# Patient Record
Sex: FEMALE | Race: WHITE | NOT HISPANIC OR LATINO | Employment: FULL TIME | URBAN - METROPOLITAN AREA
[De-identification: names, ages, dates, MRNs, and addresses within clinical notes are randomized per-mention and may not be internally consistent; named-entity substitution may affect disease eponyms.]

---

## 2017-06-20 ENCOUNTER — GENERIC CONVERSION - ENCOUNTER (OUTPATIENT)
Dept: OTHER | Facility: OTHER | Age: 42
End: 2017-06-20

## 2017-06-22 ENCOUNTER — ALLSCRIPTS OFFICE VISIT (OUTPATIENT)
Dept: OTHER | Facility: OTHER | Age: 42
End: 2017-06-22

## 2017-07-03 ENCOUNTER — ALLSCRIPTS OFFICE VISIT (OUTPATIENT)
Dept: RADIOLOGY | Facility: CLINIC | Age: 42
End: 2017-07-03
Payer: COMMERCIAL

## 2017-07-17 ENCOUNTER — GENERIC CONVERSION - ENCOUNTER (OUTPATIENT)
Dept: OTHER | Facility: OTHER | Age: 42
End: 2017-07-17

## 2017-07-18 DIAGNOSIS — M54.16 RADICULOPATHY OF LUMBAR REGION: ICD-10-CM

## 2017-07-19 ENCOUNTER — APPOINTMENT (OUTPATIENT)
Dept: RADIOLOGY | Facility: CLINIC | Age: 42
End: 2017-07-19
Payer: COMMERCIAL

## 2017-07-19 ENCOUNTER — GENERIC CONVERSION - ENCOUNTER (OUTPATIENT)
Dept: OTHER | Facility: OTHER | Age: 42
End: 2017-07-19

## 2017-07-19 ENCOUNTER — TRANSCRIBE ORDERS (OUTPATIENT)
Dept: RADIOLOGY | Facility: CLINIC | Age: 42
End: 2017-07-19

## 2017-07-19 DIAGNOSIS — M54.16 RADICULOPATHY OF LUMBAR REGION: ICD-10-CM

## 2017-07-19 PROCEDURE — 72110 X-RAY EXAM L-2 SPINE 4/>VWS: CPT

## 2018-01-10 NOTE — MISCELLANEOUS
Message   Recorded as Task   Date: 04/20/2016 09:08 AM, Created By: Madi Simpson   Task Name: Follow Up   Assigned To: [facility] ,Team   Regarding Patient: Amisha Montanez, Status: In Progress   Comment:    DelroyusLorena - 20 Apr 2016 9:08 AM     TASK CREATED  S/p Right SIJ inj on 4/14/16 w/Dr Baljeet Ohara in [place]  RT L3 4 5 S1 MBB 0 25% on 4/28/16   IvonneSonia - 22 Apr 2016 11:13 AM     TASK EDITED  1st attempt - left message on patient's voice mail to call our office back regarding followup after injection  Sonia Coronado - 28 Apr 2016 9:06 AM     TASK EDITED  2nd attempt - left message on patient's voice mail to call our office back regarding followup after injection  Sonia Coronado - 29 Apr 2016 10:33 AM     TASK REASSIGNED: Previously Assigned To [facility] [place] [facility]  Please send Cannot reach you letter      Thank you   Letter Sent  Active Problems    1  Bilateral leg weakness (729 89) (M62 81)   2  Chronic low back pain (724 2,338 29) (M54 5,G89 29)   3  Chronic lumbar radiculopathy (724 4) (M54 16)   4  Disorder of sacrum (724 6) (M53 3)   5  Facet syndrome, lumbar (724 8) (M48 8X6)   6  Herniated nucleus pulposus, L5-S1 (722 10) (M51 27)   7  Lumbar spondylosis (721 3) (M47 816)   8  Pain syndrome, chronic (338 4) (G89 4)   9  Right hip pain (719 45) (M25 551)    Current Meds   1  [place] Thyroid TABS; TAKE 1 TABLET DAILY  TDD:60;   Therapy: (Recorded:18Mar2016) to Recorded   2  Cymbalta 30 MG Oral Capsule Delayed Release Particles (DULoxetine HCl); TAKE 1   CAPSULE DAILY; Therapy: (Recorded:18Mar2016) to Recorded   3  Ibuprofen 800 MG Oral Tablet; twice daily; Therapy: (Recorded:18Mar2016) to Recorded   4  Percocet TABS (Oxycodone-Acetaminophen); 5-325 mg as needed; Therapy: (Recorded:18Mar2016) to Recorded    Allergies    1   Demerol TABS    Signatures   Electronically signed by : Clara De La Rosa, ; May 10 2016 11:21AM EST                       (Author)

## 2018-01-11 NOTE — RESULT NOTES
Message   Recorded as Task   Date: 02/05/2016 08:41 AM, Created By: Macy Griffith   Task Name: Follow Up   Assigned To: [facility]   Regarding Patient: Harmony Philip, Status: In Progress   Sonia Vuongcharley - 05 Feb 2016 8:41 AM     TASK CREATED  Pt  s/p LESI on 2/1/16 at 1030 am w/ Dr Keagan Woody scheduled on 2/29/16 at 9 am w/ DG    Please contact pt  on 2/8/16     Lorena Carpenter - 15 Feb 2016 2:58 PM     TASK EDITED  S/w pt for f/u after injection  -states she has 60% improvement   -if she sits or stands too long pain increases & returns   -confirmed f/u ov w/Mando scheduled for 2/29/16   -pt will call if needed before that appointment if pain gets worse   Christoph Urena - 16 Feb 2016 8:06 AM     TASK REPLIED TO: Previously Assigned To Christoph Urena  aware        Signatures   Electronically signed by : Latrell Andrade RN; Feb 16 2016 10:02AM EST                       (Author)

## 2018-01-12 NOTE — RESULT NOTES
Message   Recorded as Task   Date: 07/06/2017 11:10 AM, Created By: Fidelia Hayward   Task Name: Follow Up   Assigned To: [facility] sched,Team   Regarding Patient: Desiree Mead, Status: Active   CommentLeane \[facility]\"" - 06 Jul 2017 11:10 AM     TASK CREATED  S/P RT SIJ on 07/03/2017 w/[facility] [place]  No f/u scheduled       Please call on 07/10/2017   Fidelia Hayward - 10 Jul 2017 10:48 AM     TASK EDITED  1st attempt LM to cb f/u to injection  Fidelia Hayward - 12 Jul 2017 10:04 AM     TASK EDITED  2nd attempt LM to cb   [facility] - 13 Jul 2017 11:17 AM     TASK EDITED  3rd attempt to reach pt, lm for pt to cb   Fidelia Hayward - 14 Jul 2017 8:53 AM     TASK EDITED  Please send Nayana Jasper - 17 Jul 2017 10:26 AM     TASK COMPLETED   Radha Valverde - 18 Jul 2017 9:34 AM     TASK REACTIVATED  phone call from patient returning call, patient states she was on vacation  patient states after the injection her back went out and right now she is feeling pretty lousy  patient can be reached at [phone]  Dhruv Hurst - 18 Jul 2017 9:55 AM     TASK EDITED  S/w pt, confirmed R SI joint inj on 7/3  Pt states she did not have significant relief after that injection  States that she "felt something" when she stood up on 7/14, was hiking - not strenuous - and could feel the area tightening  Pt states the pain is midline, down both legs, below r knee, stops at the knee on her L leg  Chiro yesterday - some relief  Pt c/o a lot of muscle tightness  Advised pt, will d/w Dr Sabiha Martinez / [facility] Call and cb to advise  Pt verbalized understanding and appreciation  Christoph Urena - 18 Jul 2017 10:52 AM     TASK REPLIED TO: Previously Assigned To Christoph Urena  id like tyo get x-ray and mri   Dhruv Hurst - 18 Jul 2017 11:10 AM     TASK EDITED  s/w pt, advised of above  Pt will  orders at the  on 7/19 between 8/4  Advised pt, this office will call with an A# for MRI  Do not schedule mri until the A# is provided   Pt verbalized understanding and appreication  Orders are at the  for Cami Villagomez - 19 Jul 2017 10:46 AM     TASK EDITED  MRI [employer] will be addressed in the financial task

## 2018-01-12 NOTE — MISCELLANEOUS
Message   Recorded as Task   Date: 06/19/2017 08:19 AM, Created By: Kamran Panchal   Task Name: Miscellaneous   Assigned To: [facility] clerical,Team   Regarding Patient: Juliette Escoto, Status: In Progress   Comment:    Ellison,Cami - 19 Jun 2017 8:19 AM     TASK CREATED  Caller: Self; [phone] (Home); [phone] (Work)  pt would like to get another SIJ injec  Last sij was done 4/14/2016 in which pt states she had great relief from that inj      pt c/o LBP starting to flare up it symptoms wrap around her hips and down to knees  Pt states no change in medical history, denies bld thinners/antbx  Pt states over the weekend she tried muscle relaxer and ibuprofen  advise will discuss w/provider and rtc  Mando Mcnally - 19 Jun 2017 8:33 AM     TASK REPLIED TO: Previously Assigned To Mando Mcnally  She will need an OV for re-evaluation since it has been over a year that she has been seen  Thank you  I can see her tomorrow or Thursday if you want to double book her  Cami Ellison - 19 Jun 2017 3:12 PM     TASK REASSIGNED: Previously Assigned To Yudy Ruff - 20 Jun 2017 1:37 PM     TASK IN PROGRESS   Agustina Chapman - 20 Jun 2017 1:49 PM     TASK EDITED  Pt is scheduled for 6/22/17 @ 2:15  Active Problems    1  Bilateral leg weakness (729 89) (R29 898)   2  Chronic low back pain (724 2,338 29) (M54 5,G89 29)   3  Chronic lumbar radiculopathy (724 4) (M54 16)   4  Disorder of sacrum (724 6) (M53 3)   5  Facet syndrome, lumbar (724 8) (M12 88)   6  Herniated nucleus pulposus, L5-S1 (722 10) (M51 27)   7  Lumbar spondylosis (721 3) (M47 816)   8  Pain syndrome, chronic (338 4) (G89 4)   9  Right hip pain (719 45) (M25 551)    Current Meds   1  [place] Thyroid TABS; TAKE 1 TABLET DAILY  TDD:60;   Therapy: (Recorded:18Mar2016) to Recorded   2  Cymbalta 30 MG Oral Capsule Delayed Release Particles (DULoxetine HCl); TAKE 1   CAPSULE DAILY;    Therapy: (Recorded:18Mar2016) to Recorded 3  Ibuprofen 800 MG Oral Tablet; twice daily; Therapy: (Recorded:18Mar2016) to Recorded   4  Percocet TABS (Oxycodone-Acetaminophen); 5-325 mg as needed; Therapy: (Recorded:18Mar2016) to Recorded    Allergies    1   Demerol TABS    Signatures   Electronically signed by : Julia Ochoa, ; Jun 20 2017  1:50PM EST                       (Author)

## 2018-01-12 NOTE — RESULT NOTES
Message   Recorded as Task   Date: 01/22/2016 08:18 AM, Created By: Rod Fisher   Task Name: Follow Up   Assigned To: [facility]   Regarding Patient: Dante Cannon, Status: Active   Comment:    Lorena Carpenter - 22 Jan 2016 8:18 AM     TASK CREATED  S/p B/L S1 TFESI on 1/15/16 w/Dr Britt Fajardo in [facility]  F/u schedule 1/29/16   Lorena Carpenter - 28 Jan 2016 9:38 AM     TASK EDITED  F/u scheduled for 1/29/16-will discuss at that time

## 2018-01-13 VITALS
HEIGHT: 66 IN | SYSTOLIC BLOOD PRESSURE: 120 MMHG | BODY MASS INDEX: 27.64 KG/M2 | HEART RATE: 64 BPM | TEMPERATURE: 97.9 F | WEIGHT: 172 LBS | DIASTOLIC BLOOD PRESSURE: 74 MMHG

## 2018-01-13 NOTE — MISCELLANEOUS
Message   Recorded as Task   Date: 07/06/2017 11:10 AM, Created By: Zoya Claire   Task Name: Follow Up   Assigned To: [address] ,Team   Regarding Patient: Juliette Escoto, Status: Active   CommentClara Gonzalez - 06 Jul 2017 11:10 AM     TASK CREATED  S/P RT SIJ on 07/03/2017 w/[facility] [place]  No f/u scheduled       Please call on 07/10/2017   Zoya Alethea - 10 Jul 2017 10:48 AM     TASK EDITED  1st attempt LM to cb f/u to injection  Zoya Devoid - 12 Jul 2017 10:04 AM     TASK EDITED  2nd attempt LM to cb   Nada Grapes - 13 Jul 2017 11:17 AM     TASK EDITED  3rd attempt to reach pt, lm for pt to cb   Zoya Devoid - 14 Jul 2017 8:53 AM     TASK EDITED  Please send CNRYL   Letter Sent      Active Problems    1  Bilateral leg weakness (729 89) (R29 898)   2  Chronic low back pain (724 2,338 29) (M54 5,G89 29)   3  Chronic lumbar radiculopathy (724 4) (M54 16)   4  Disorder of sacrum (724 6) (M53 3)   5  Facet syndrome, lumbar (724 8) (M12 88)   6  Herniated nucleus pulposus, L5-S1 (722 10) (M51 27)   7  Lumbar spondylosis (721 3) (M47 816)   8  Pain syndrome, chronic (338 4) (G89 4)   9  Right hip pain (719 45) (M25 551)   10  Sacroiliitis (720 2) (M46 1)    Current Meds   1  [place] Thyroid TABS; TAKE 1 TABLET DAILY  TDD:60;   Therapy: (Recorded:18Mar2016) to Recorded   2  Ibuprofen 800 MG Oral Tablet; twice daily; Therapy: (Recorded:18Mar2016) to Recorded   3  Percocet TABS (Oxycodone-Acetaminophen); 5-325 mg as needed; Therapy: (Recorded:18Mar2016) to Recorded    Allergies    1   Demerol TABS    Signatures   Electronically signed by : Marie Starr, ; Jul 17 2017 10:25AM EST                       (Author)

## 2018-01-14 NOTE — RESULT NOTES
Message   Recorded as Task   Date: 01/12/2016 03:11 PM, Created By: Mylene Wright   Task Name: Miscellaneous   Assigned To: Cami Ellison   Regarding Patient: Ramses Baltazar, Status: Active   Comment:    Cami Ellison - 12 Jan 2016 3:11 PM     TASK CREATED  Caller: Self; [phone] (Home); [phone] (Work)  pt would like another injection last inj 10/9 was a lesi 9/25 was bilat s1 tfesi   pt c/o same symptoms states pain lower back down both legs when standing a long time the rt foot feels like it fell asleep at time not always   would you like me to sched  inj or ov   if inj lesi or tfesi bilat   Christoph Urena - 12 Jan 2016 3:19 PM     TASK REPLIED TO: Previously Assigned To Christoph Urena  can repeat the b/l S1 tfesi   Cami Ellison - 12 Jan 2016 3:56 PM     TASK EDITED  l/m for pt to contact office to schedule procedure  Cami Ellison - 13 Jan 2016 2:02 PM     TASK EDITED  pt scheduled for 1/15/2016  Dee Dee Lightning

## 2018-01-17 NOTE — PROGRESS NOTES
Assessment    1  Chronic low back pain (724 2,338 29) (M54 5,G89 29)   2  Pain syndrome, chronic (338 4) (G89 4)   3  Herniated nucleus pulposus, L5-S1 (722 10) (M51 27)   4  Chronic lumbar radiculopathy (724 4) (M54 16)    Plan  Chronic low back pain, Chronic lumbar radiculopathy, Herniated nucleus pulposus,  L5-S1    · Injection/Infusion Neurolytic Substance w/wo oth Therapeutic Substance Epidural  Lumbar/Sacral; Status:Complete;   Done: [id number]   Perform:[facility]; Order Comments:GRIFFINI with Dr Jolanta Martin; TNX:[id number];SUARGII; For:Chronic low back pain, Chronic lumbar radiculopathy, Herniated nucleus pulposus, L5-S1; Ordered By:Mando Mcnally;  Pain syndrome, chronic    · Follow-up PRN Evaluation and Treatment  Follow-up  Status: Complete  Done:  [id number]   Ordered; For: Pain syndrome, chronic; Ordered By: [place] Feeling Performed:  Due: 19YBU6263    Discussion/Summary    While the patient was in the office today, I did have a thorough conversation with the patient regarding her medication regimen and treatment plan  Plain to the patient that we could consider proceeding with another injection, however, since this would be the fourth injection since September, we would then have to hold off on any injections for at least 6 months  I did discuss this with Dr Jolanta Martin at this point he would recommend proceeding with an interlaminar lumbar epidural steroid injection  The patient was agreeable and verbalized an understanding  Complete risks and benefits including bleeding, infection, tissue reaction, nerve injury and allergic reaction were discussed  The approach was demonstrated using models and literature was provided  Verbal and written consent was obtained  While the patient was in the office today, I also discussed that at this point she may want to give some serious consideration towards a surgical opinion   The patient reports that she has had at least 2 surgical opinions in the last 7-10 years and is not sure that surgery is the option should like to pursue, but will keep it in mind  I also discussed with the patient that I do feel there is a significant neuropathic component to her pain symptoms and that she would benefit from a neuron membrane stabilizer such as gabapentin  I discussed with the patient the type of medication it is, how it works, and that it requires a titration process that is specific to each individual  I reviewed with the patient that it may take 3-4 weeks for the medication's effects to be noticed and that it should never be abruptly stopped  Possible side effects include but are not limited to; vertigo, lethargy, nausea, and edema of the extremities  Advised the patient to call our office if they experience any side effects  The patient verbalized an understanding  However, at this point the patient one to think about the gabapentin would let us know she would like to proceed with gabapentin  I also discussed with the patient at this point Dr Damaso Chiu feels that she may be a good spinal cord stimulant or candidate in the future, however, the patient did not seem overly interested in stimulation and wanted to proceed with the injection and see how she does  Possible side effects of new medications were reviewed with the patient/guardian today  The treatment plan was reviewed with the patient/guardian  The patient/guardian understands and agrees with the treatment plan   The patient was counseled regarding instructions for management, prognosis, patient and family education, impressions, risks and benefits of treatment options and importance of compliance with treatment  total time of encounter was 25 minutes  Chief Complaint    1  Pain  Right sided greater than left low back and leg pain, worsening        History of Present Illness  The patient presents today for a follow-up office visit and reevaluation of her worsening right greater than left low back and radicular leg pain symptoms  The patient reports that she feels her pain symptoms are a little bit different as the previous injection she received on January 15, 2016 was a bilateral S1 transforaminal epidural steroid injection and that seemed to help the lateral and posterior radicular leg pain symptoms, however, her current pain symptoms are radiating around to her bilateral groin and anterior thighs down to her knees  She presents today to discuss her medication regimen and treatment plan options as well as C we will consider proceeding with a repeat injection  JOHN CRAFT presents with complaints of constant episodes of bilateral lower back pain, described as sharp, radiating to the lower back  On a scale of 1 to 10, the patient rates the pain as 7  Symptoms are worsening  Review of Systems    Constitutional: no fever, no recent weight gain and no recent weight loss  Eyes: no double vision and no blurry vision  Cardiovascular: no chest pain, no palpitations and no lower extremity edema  Respiratory: no complaints of shortness of breath and no wheezing  Musculoskeletal: difficulty walking, joint stiffness and decreased range of motion, but no muscle weakness, no joint swelling, no limb swelling` and no pain in extremity  Neurological: no dizziness, no difficulty swallowing, no memory loss, no loss of consciousness and no seizures  Gastrointestinal: no nausea, no vomiting, no constipation and no diarrhea  Genitourinary: no difficulty initiating urine stream, no genital pain and no frequent urination  Integumentary: no complaints of skin rash  Psychiatric: no depression  Endocrine: no excessive thirst, no adrenal disease, no hypothyroidism and no hyperthyroidism  Hematologic/Lymphatic: no tendency for easy bruising and no tendency for easy bleeding  Active Problems    1  Chronic low back pain (724 2,338 29) (M54 5,G89 29)   2  Chronic lumbar radiculopathy (724 4) (M54 16)   3   Herniated nucleus pulposus, L5-S1 (722 10) (M51 27)   4  Pain syndrome, chronic (338 4) (G89 4)    Past Medical History    1  History of Depression (311) (F32 9)   2  History of hypothyroidism (V12 29) (Z86 39)    The active problems and past medical history were reviewed and updated today  Surgical History    1  History of Ankle Repair   2  History of Primary Repair Of Knee Ligament Cruciate Anterior   3  History of Thyroid Surgery Total Thyroidectomy    The surgical history was reviewed and updated today  Family History    1  Family history of Diabetes Mellitus (V18 0)   2  Family history of Hypertension (V17 49)    The family history was reviewed and updated today  Social History    · Being A Social Drinker   · Denied: History of Drug Use   · Marital History - Currently    · Never A Smoker   · Working Full Time  The social history was reviewed and updated today  The social history was reviewed and is unchanged  Current Meds   1  Advil CAPS Recorded   2  [place] Thyroid TABS Recorded   3  Cymbalta 30 MG Oral Capsule Delayed Release Particles Recorded   4  DrRx Flexeril 10 MG #30 Recorded   5  Percocet TABS Recorded    The medication list was reviewed and updated today  Allergies    1  Demerol TABS    Vitals  Vital Signs [Data Includes: Current Encounter]    Recorded: 32GQW9922 08:44AM   Temperature 98 2 F   Heart Rate 60   Systolic 520   Diastolic 66   Height 5 ft 7 in   Weight 167 lb    BMI Calculated 26 16   BSA Calculated 1 87   Pain Scale 7     Physical Exam    Constitutional   General appearance: Well developed, well nourished, alert, in no distress, non-toxic and no overt pain behavior  Eyes   Sclera: anicteric   HEENT   Hearing grossly intact  Pulmonary   Respiratory effort: Even and unlabored  Cardiovascular   Examination of extremities: No edema or pitting edema present  Abdomen   Abdomen: Soft, non-tender, non-distended        Skin   Skin and subcutaneous tissue: Normal without rashes or lesions, well hydrated  Psychiatric   Mood and affect: Mood and affect appropriate  Neurologic Motor Tone:    Cranial nerves: Cranial nerves II-XII grossly intact  Slow, painful, but steady gait without the assistance of any assistive devices     Musculoskeletal       Future Appointments    Date/Time Provider Specialty Site   02/01/2016 10:30 AM Marcella Lema DO [facility] OUTPATIENT     Signatures   Electronically signed by : Madie Dominguez ; Jan 30 2016  9:19AM EST                       (Author)    Electronically signed by : Sandie Woods DO; Jan 31 2016  1:47PM EST

## 2018-01-17 NOTE — MISCELLANEOUS
Message   Recorded as Task   Date: 03/07/2016 01:45 PM, Created By: Ellyn Lemus   Task Name: Care Coordination   Assigned To: Paulina Conway   Regarding Patient: Fadi Torres, Status: Active   CommentJarrod Shaffer - 07 Mar 2016 1:45 PM     TASK CREATED  Please schedule a consultation for patient with Dr Veronica Barahona per order in chart  Thank you   Leigha Huerta - 07 Mar 2016 3:00 PM     TASK REASSIGNED: Previously Assigned To [facility],Team   Dyana Bhat - 09 Mar 2016 9:58 AM     TASK EDITED  CALLED PT AND LEFT MESSAGE ON MACHINE  REFERRED BY SALINAS PEREZ (DR JACKSON)  REFERRED TO DR BURGER  FOR CHRONIC LBP  9/09/2015 [facility] MRI L-SPINE  EMG W/SPINE & PAIN ON 6/03/2013   Dyana Bhat - 10 Mar 2016 8:42 AM     TASK EDITED  INTAKE COMPLETED AND IN REVIEW W/MD Marin Kimball - 14 Mar 2016 8:25 AM     TASK EDITED  CALLED PT AND LEFT MESSAGE ON MACHINE TO SCHEDULE APPT   Dyana Bhat - 14 Mar 2016 8:40 AM     TASK EDITED  PT IS SCHEDULED W/DR BURGER IN [place] OFFICE ON 3/18/2016 @ 9:00am  TY        Active Problems    1  Chronic low back pain (724 2,338 29) (M54 5,G89 29)   2  Chronic lumbar radiculopathy (724 4) (M54 16)   3  Herniated nucleus pulposus, L5-S1 (722 10) (M51 27)   4  Pain syndrome, chronic (338 4) (G89 4)    Current Meds   1  Advil CAPS Recorded   2  [place] Thyroid TABS Recorded   3  Cymbalta 30 MG Oral Capsule Delayed Release Particles (DULoxetine HCl) Recorded   4  DrRx Flexeril 10 MG #30 Recorded   5  Percocet TABS (Oxycodone-Acetaminophen) Recorded    Allergies    1  Demerol TABS    Signatures   Electronically signed by :  Ying Palmer, ; Mar 14 2016  9:25AM EST                       (Author)

## 2020-02-04 ENCOUNTER — TRANSCRIBE ORDERS (OUTPATIENT)
Dept: NEUROSURGERY | Facility: CLINIC | Age: 45
End: 2020-02-04

## 2020-02-04 DIAGNOSIS — G89.4 CHRONIC PAIN SYNDROME: Primary | ICD-10-CM

## 2020-02-06 ENCOUNTER — CONSULT (OUTPATIENT)
Dept: NEUROSURGERY | Facility: CLINIC | Age: 45
End: 2020-02-06
Payer: OTHER MISCELLANEOUS

## 2020-02-06 VITALS
SYSTOLIC BLOOD PRESSURE: 119 MMHG | HEIGHT: 66 IN | WEIGHT: 179 LBS | BODY MASS INDEX: 28.77 KG/M2 | RESPIRATION RATE: 16 BRPM | DIASTOLIC BLOOD PRESSURE: 84 MMHG | HEART RATE: 78 BPM

## 2020-02-06 DIAGNOSIS — G89.4 CHRONIC PAIN SYNDROME: Primary | ICD-10-CM

## 2020-02-06 DIAGNOSIS — M54.16 LUMBAR RADICULOPATHY: ICD-10-CM

## 2020-02-06 PROCEDURE — 99204 OFFICE O/P NEW MOD 45 MIN: CPT | Performed by: NEUROLOGICAL SURGERY

## 2020-02-06 RX ORDER — CHLORHEXIDINE GLUCONATE 0.12 MG/ML
15 RINSE ORAL ONCE
Status: CANCELLED | OUTPATIENT
Start: 2020-02-06 | End: 2020-02-06

## 2020-02-06 RX ORDER — DULOXETIN HYDROCHLORIDE 60 MG/1
60 CAPSULE, DELAYED RELEASE ORAL 2 TIMES DAILY
COMMUNITY

## 2020-02-06 RX ORDER — LANOLIN ALCOHOL/MO/W.PET/CERES
1 CREAM (GRAM) TOPICAL DAILY
COMMUNITY

## 2020-02-06 RX ORDER — ACETAMINOPHEN 325 MG/1
975 TABLET ORAL ONCE
Status: CANCELLED | OUTPATIENT
Start: 2020-02-06 | End: 2020-02-06

## 2020-02-06 RX ORDER — PREGABALIN 75 MG/1
75 CAPSULE ORAL 2 TIMES DAILY
COMMUNITY
Start: 2020-01-21

## 2020-02-06 RX ORDER — DICLOFENAC SODIUM 75 MG/1
75 TABLET, DELAYED RELEASE ORAL AS NEEDED
COMMUNITY
Start: 2019-12-17 | End: 2020-03-17 | Stop reason: HOSPADM

## 2020-02-06 RX ORDER — TRAMADOL HYDROCHLORIDE 50 MG/1
50 TABLET ORAL AS NEEDED
COMMUNITY
End: 2020-03-17 | Stop reason: HOSPADM

## 2020-02-06 RX ORDER — GABAPENTIN 100 MG/1
300 CAPSULE ORAL ONCE
Status: CANCELLED | OUTPATIENT
Start: 2020-02-06 | End: 2020-02-06

## 2020-02-06 RX ORDER — IBUPROFEN 200 MG
800 TABLET ORAL AS NEEDED
COMMUNITY
End: 2020-03-17 | Stop reason: HOSPADM

## 2020-02-06 RX ORDER — THYROID 60 MG
60 TABLET ORAL DAILY
COMMUNITY
Start: 2020-01-18

## 2020-02-06 RX ORDER — CEFAZOLIN SODIUM 2 G/50ML
2000 SOLUTION INTRAVENOUS ONCE
Status: CANCELLED | OUTPATIENT
Start: 2020-03-17 | End: 2020-02-06

## 2020-02-06 NOTE — PROGRESS NOTES
Office Note - Neurosurgery   Robin Curiel 40 y o  female MRN: [id number]      Assessment:    Patient is stable  51-year-old woman with chronic pain syndrome and bilateral lumbar radiculopathy  She has tried a number of nonsurgical pain management with limited improvement in her symptoms  These continue impact on her quality of life in daily activities  She recently underwent Nevro spinal cord stimulator trial and had 60% improvement in lower back pain and 80% improvement in her leg pain with overall improvement in activity level and sleep quality  She is also able to cut back on some of her medical marijuana  She is a candidate for insertion of thoracic spinal cord stimulator electrode via laminotomy and placement of left buttock implantable pulse generator  The goal of surgery is to improve chronic pain but not necessarily completely resolved pain  Weakness and numbness are unlikely to improve  The risks of surgery were reviewed in detail  1   Risk of general anesthetic with possible cardiac and respiratory complication  Risk of infection and bleeding/transfusion  2   Risk of neurological injury with new pain, weakness or numbness, paralysis, difficulties with bowel bladder function  Risk of CSF leak  3   Risk of device malfunction, and failure of treatment  Need for revision surgery  MRI condition all status was reviewed as well  Expected postoperative course, including activity restrictions, expected pain and postoperative medication were reviewed  Patient provided verbal consent to surgical procedure and signed consent form: Yes  She will require an up-to-date MRI of the thoracic spine for planning  History, physical examination and diagnostic tests were reviewed and questions answered  Diagnosis, care plan and treatment options were discussed  The patient understand instructions and will follow up as directed      Plan:    Follow-up:  Surgery    Problem List Items Addressed This Visit        Nervous and Auditory    Lumbar radiculopathy    Relevant Orders    MRI thoracic spine without contrast    Case request operating room: Insertion of thoracic spinal cord stimulator extra via laminotomy and placement of left buttock implantable pulse generator (Completed)      Other Visit Diagnoses     Chronic pain syndrome    -  Primary    Relevant Orders    MRI thoracic spine without contrast    Case request operating room: Insertion of thoracic spinal cord stimulator extra via laminotomy and placement of left buttock implantable pulse generator (Completed)          Subjective/Objective     Chief Complaint    Lower back and bilateral leg pain  HPI    70-year-old woman with longstanding history of lower back and bilateral leg pain  These were exacerbated after work related injury in 2017  She has tried a number of different pain medications and injections and physical therapy without significant improvement in her symptoms  She recently underwent Nevro spinal cord stimulator trial and noted 60% improvement in her lower back pain and 80% improvement in her bilateral leg pain  She was somewhat more comfortable sleeping and was somewhat more active  She was able to stop her medical marijuana temporarily as well  She presents today to discuss implantation of a permanent system  HAY GUIDO personally reviewed and updated  Review of Systems   Constitutional: Positive for fatigue  HENT: Negative  Eyes: Negative  Respiratory: Negative  Cardiovascular: Negative  Gastrointestinal: Negative  Endocrine: Negative  Genitourinary: Negative  Musculoskeletal: Positive for back pain (centered of lower back radiates across lower back radiates into bilateral hips and down bilateral legs ) and gait problem  Skin: Negative  Allergic/Immunologic: Negative      Neurological: Positive for weakness (bilateral legs ), numbness (right foot, big toe, numbness and tingling ) and headaches  Negative for dizziness, seizures and syncope  Hematological: Negative  Psychiatric/Behavioral: Positive for sleep disturbance (due to pain )  Negative for confusion         Family History    Family History   Problem Relation Age of Onset    Diabetes Father        Social History    Social History     Socioeconomic History    Marital status: /Civil Union     Spouse name: Not on file    Number of children: Not on file    Years of education: Not on file    Highest education level: Not on file   Occupational History    Not on file   Social Needs    Financial resource strain: Not on file    Food insecurity:     Worry: Not on file     Inability: Not on file    Transportation needs:     Medical: Not on file     Non-medical: Not on file   Tobacco Use    Smoking status: Never Smoker    Smokeless tobacco: Never Used   Substance and Sexual Activity    Alcohol use: Yes     Comment: social     Drug use: Yes     Types: Marijuana     Comment: Medical marijuana     Sexual activity: Not on file   Lifestyle    Physical activity:     Days per week: Not on file     Minutes per session: Not on file    Stress: Not on file   Relationships    Social connections:     Talks on phone: Not on file     Gets together: Not on file     Attends [employer] service: Not on file     Active member of club or organization: Not on file     Attends meetings of clubs or organizations: Not on file     Relationship status: Not on file    Intimate partner violence:     Fear of current or ex partner: Not on file     Emotionally abused: Not on file     Physically abused: Not on file     Forced sexual activity: Not on file   Other Topics Concern    Not on file   Social History Narrative    Not on file       Past Medical History    Past Medical History:   Diagnosis Date    Depression     Hypothyroidism     Mitral valve prolapse        Surgical History    Past Surgical History:   Procedure Laterality Date    KNEE ARTHROSCOPY W/ ACL RECONSTRUCTION Left     MYOMECTOMY      REPAIR ANKLE LIGAMENT Right        Medications      Current Outpatient Medications:     ARMOUR THYROID 60 MG tablet, Take 60 mg by mouth daily, Disp: , Rfl:     BIOTIN PO, Take by mouth daily, Disp: , Rfl:     diclofenac (VOLTAREN) 75 mg EC tablet, Take 75 mg by mouth as needed, Disp: , Rfl:     DULoxetine (CYMBALTA) 60 mg delayed release capsule, Take 60 mg by mouth 2 (two) times a day, Disp: , Rfl:     glucosamine-chondroitin 500-400 MG tablet, Take 1 tablet by mouth daily, Disp: , Rfl:     ibuprofen (MOTRIN) 200 mg tablet, Take 800 mg by mouth as needed for mild pain, Disp: , Rfl:     pregabalin (LYRICA) 75 mg capsule, Take 75 mg by mouth 2 (two) times a day, Disp: , Rfl:     traMADol (ULTRAM) 50 mg tablet, Take 50 mg by mouth as needed for moderate pain, Disp: , Rfl:     Allergies    Allergies   Allergen Reactions    Meperidine Other (See Comments)     Other reaction(s): blood pressure drops extrememly low,  Annotation - [id number]: passed out       The following portions of the patient's history were reviewed and updated as appropriate: allergies, current medications, past family history, past medical history, past social history, past surgical history and problem list     Physical Exam    Vitals:  Blood pressure 119/84, pulse 78, resp  rate 16, height 5' 6" (1 676 m), weight 81 2 kg (179 lb), last menstrual period 02/05/2020  ,Body mass index is 28 89 kg/m²  Physical Exam   Constitutional: She is oriented to person, place, and time  She appears well-developed and well-nourished  No distress  HENT:   Head: Atraumatic  Eyes: EOM are normal    Neck: Normal range of motion  Cardiovascular: Normal rate, regular rhythm and normal heart sounds  Pulmonary/Chest: Effort normal and breath sounds normal  No respiratory distress  Musculoskeletal: She exhibits no deformity  Neurological: She is alert and oriented to person, place, and time  5/5 power in lower extremities  Reports normal light touch sensation lower extremities  Walks with a steady gait  Skin: Skin is warm and dry  Psychiatric: She has a normal mood and affect  Her behavior is normal    Vitals reviewed  Neurologic Exam     Mental Status   Oriented to person, place, and time       Cranial Nerves     CN III, IV, VI   Extraocular motions are normal

## 2020-02-12 ENCOUNTER — APPOINTMENT (OUTPATIENT)
Dept: LAB | Facility: HOSPITAL | Age: 45
End: 2020-02-12
Attending: NEUROLOGICAL SURGERY
Payer: COMMERCIAL

## 2020-02-12 DIAGNOSIS — G89.4 CHRONIC PAIN SYNDROME: ICD-10-CM

## 2020-02-12 DIAGNOSIS — M54.16 LUMBAR RADICULOPATHY: ICD-10-CM

## 2020-02-12 DIAGNOSIS — Z01.818 PRE-PROCEDURAL EXAMINATION: ICD-10-CM

## 2020-02-12 LAB
ALBUMIN SERPL BCP-MCNC: 3.6 G/DL (ref 3.5–5)
ALP SERPL-CCNC: 53 U/L (ref 46–116)
ALT SERPL W P-5'-P-CCNC: 24 U/L (ref 12–78)
ANION GAP SERPL CALCULATED.3IONS-SCNC: 4 MMOL/L (ref 4–13)
APTT PPP: 29 SECONDS (ref 23–37)
AST SERPL W P-5'-P-CCNC: 15 U/L (ref 5–45)
B-HCG SERPL-ACNC: <2 MIU/ML
BASOPHILS # BLD AUTO: 0.04 THOUSANDS/ΜL (ref 0–0.1)
BASOPHILS NFR BLD AUTO: 1 % (ref 0–1)
BILIRUB SERPL-MCNC: 0.45 MG/DL (ref 0.2–1)
BUN SERPL-MCNC: 19 MG/DL (ref 5–25)
CALCIUM SERPL-MCNC: 9 MG/DL (ref 8.3–10.1)
CHLORIDE SERPL-SCNC: 108 MMOL/L (ref 100–108)
CO2 SERPL-SCNC: 27 MMOL/L (ref 21–32)
CREAT SERPL-MCNC: 0.6 MG/DL (ref 0.6–1.3)
EOSINOPHIL # BLD AUTO: 0.14 THOUSAND/ΜL (ref 0–0.61)
EOSINOPHIL NFR BLD AUTO: 2 % (ref 0–6)
ERYTHROCYTE [DISTWIDTH] IN BLOOD BY AUTOMATED COUNT: 12.1 % (ref 11.6–15.1)
EST. AVERAGE GLUCOSE BLD GHB EST-MCNC: 120 MG/DL
GFR SERPL CREATININE-BSD FRML MDRD: 111 ML/MIN/1.73SQ M
GLUCOSE P FAST SERPL-MCNC: 89 MG/DL (ref 65–99)
HBA1C MFR BLD: 5.8 %
HCT VFR BLD AUTO: 39.2 % (ref 34.8–46.1)
HGB BLD-MCNC: 12.6 G/DL (ref 11.5–15.4)
IMM GRANULOCYTES # BLD AUTO: 0.01 THOUSAND/UL (ref 0–0.2)
IMM GRANULOCYTES NFR BLD AUTO: 0 % (ref 0–2)
INR PPP: 1.11 (ref 0.84–1.19)
LYMPHOCYTES # BLD AUTO: 2.59 THOUSANDS/ΜL (ref 0.6–4.47)
LYMPHOCYTES NFR BLD AUTO: 42 % (ref 14–44)
MCH RBC QN AUTO: 29.6 PG (ref 26.8–34.3)
MCHC RBC AUTO-ENTMCNC: 32.1 G/DL (ref 31.4–37.4)
MCV RBC AUTO: 92 FL (ref 82–98)
MONOCYTES # BLD AUTO: 0.38 THOUSAND/ΜL (ref 0.17–1.22)
MONOCYTES NFR BLD AUTO: 6 % (ref 4–12)
NEUTROPHILS # BLD AUTO: 3.05 THOUSANDS/ΜL (ref 1.85–7.62)
NEUTS SEG NFR BLD AUTO: 49 % (ref 43–75)
NRBC BLD AUTO-RTO: 0 /100 WBCS
PLATELET # BLD AUTO: 288 THOUSANDS/UL (ref 149–390)
PMV BLD AUTO: 10.1 FL (ref 8.9–12.7)
POTASSIUM SERPL-SCNC: 4.6 MMOL/L (ref 3.5–5.3)
PROT SERPL-MCNC: 7.3 G/DL (ref 6.4–8.2)
PROTHROMBIN TIME: 13.9 SECONDS (ref 11.6–14.5)
RBC # BLD AUTO: 4.25 MILLION/UL (ref 3.81–5.12)
SODIUM SERPL-SCNC: 139 MMOL/L (ref 136–145)
WBC # BLD AUTO: 6.21 THOUSAND/UL (ref 4.31–10.16)

## 2020-02-12 PROCEDURE — 85025 COMPLETE CBC W/AUTO DIFF WBC: CPT

## 2020-02-12 PROCEDURE — 81003 URINALYSIS AUTO W/O SCOPE: CPT

## 2020-02-12 PROCEDURE — 84702 CHORIONIC GONADOTROPIN TEST: CPT

## 2020-02-12 PROCEDURE — 85610 PROTHROMBIN TIME: CPT

## 2020-02-12 PROCEDURE — 36415 COLL VENOUS BLD VENIPUNCTURE: CPT

## 2020-02-12 PROCEDURE — 85730 THROMBOPLASTIN TIME PARTIAL: CPT

## 2020-02-12 PROCEDURE — 83036 HEMOGLOBIN GLYCOSYLATED A1C: CPT

## 2020-02-12 PROCEDURE — 80053 COMPREHEN METABOLIC PANEL: CPT

## 2020-02-13 LAB
BILIRUB UR QL STRIP: NEGATIVE
CLARITY UR: CLEAR
COLOR UR: YELLOW
GLUCOSE UR STRIP-MCNC: NEGATIVE MG/DL
HGB UR QL STRIP.AUTO: NEGATIVE
KETONES UR STRIP-MCNC: NEGATIVE MG/DL
LEUKOCYTE ESTERASE UR QL STRIP: NEGATIVE
NITRITE UR QL STRIP: NEGATIVE
PH UR STRIP.AUTO: 6 [PH]
PROT UR STRIP-MCNC: NEGATIVE MG/DL
SP GR UR STRIP.AUTO: 1.02 (ref 1–1.03)
UROBILINOGEN UR QL STRIP.AUTO: 0.2 E.U./DL

## 2020-02-25 ENCOUNTER — HOSPITAL ENCOUNTER (OUTPATIENT)
Dept: MRI IMAGING | Facility: HOSPITAL | Age: 45
Discharge: HOME | End: 2020-02-25
Attending: NEUROLOGICAL SURGERY
Payer: OTHER MISCELLANEOUS

## 2020-02-25 DIAGNOSIS — M54.16 LUMBAR RADICULOPATHY: ICD-10-CM

## 2020-02-25 DIAGNOSIS — G89.4 CHRONIC PAIN SYNDROME: ICD-10-CM

## 2020-02-25 PROCEDURE — 72146 MRI CHEST SPINE W/O DYE: CPT

## 2020-03-09 ENCOUNTER — TELEPHONE (OUTPATIENT)
Dept: NEUROSURGERY | Facility: CLINIC | Age: 45
End: 2020-03-09

## 2020-03-09 NOTE — TELEPHONE ENCOUNTER
Received VM from patient stating that she is scheduled to have surgery with  on the 17th but she has been experiencing a "flair up" which is causing severe muscle spasm and she is not sure if she should reschedule her surgery or not  Attempted to reach patient with no answer  LMOM for call back at her convenience

## 2020-03-13 RX ORDER — CYCLOBENZAPRINE HCL 10 MG
10 TABLET ORAL 3 TIMES DAILY PRN
COMMUNITY

## 2020-03-16 ENCOUNTER — ANESTHESIA EVENT (OUTPATIENT)
Dept: PERIOP | Facility: HOSPITAL | Age: 45
End: 2020-03-16
Payer: OTHER MISCELLANEOUS

## 2020-03-16 ENCOUNTER — DOCUMENTATION (OUTPATIENT)
Dept: NEUROSURGERY | Facility: CLINIC | Age: 45
End: 2020-03-16

## 2020-03-17 ENCOUNTER — APPOINTMENT (OUTPATIENT)
Dept: RADIOLOGY | Facility: HOSPITAL | Age: 45
End: 2020-03-17
Payer: OTHER MISCELLANEOUS

## 2020-03-17 ENCOUNTER — ANESTHESIA (OUTPATIENT)
Dept: PERIOP | Facility: HOSPITAL | Age: 45
End: 2020-03-17
Payer: OTHER MISCELLANEOUS

## 2020-03-17 ENCOUNTER — HOSPITAL ENCOUNTER (OUTPATIENT)
Facility: HOSPITAL | Age: 45
Setting detail: OUTPATIENT SURGERY
Discharge: HOME | End: 2020-03-17
Attending: NEUROLOGICAL SURGERY | Admitting: NEUROLOGICAL SURGERY
Payer: OTHER MISCELLANEOUS

## 2020-03-17 VITALS
TEMPERATURE: 98 F | DIASTOLIC BLOOD PRESSURE: 72 MMHG | HEART RATE: 88 BPM | SYSTOLIC BLOOD PRESSURE: 136 MMHG | OXYGEN SATURATION: 98 % | HEIGHT: 66 IN | BODY MASS INDEX: 29.12 KG/M2 | WEIGHT: 181.2 LBS | RESPIRATION RATE: 16 BRPM

## 2020-03-17 DIAGNOSIS — G89.4 CHRONIC PAIN SYNDROME: Primary | ICD-10-CM

## 2020-03-17 DIAGNOSIS — M54.16 LUMBAR RADICULOPATHY: ICD-10-CM

## 2020-03-17 LAB
EXT PREGNANCY TEST URINE: NEGATIVE
EXT. CONTROL: NORMAL

## 2020-03-17 PROCEDURE — C1787 PATIENT PROGR, NEUROSTIM: HCPCS | Performed by: NEUROLOGICAL SURGERY

## 2020-03-17 PROCEDURE — 81025 URINE PREGNANCY TEST: CPT | Performed by: NEUROLOGICAL SURGERY

## 2020-03-17 PROCEDURE — 63655 IMPLANT NEUROELECTRODES: CPT | Performed by: PHYSICIAN ASSISTANT

## 2020-03-17 PROCEDURE — 72070 X-RAY EXAM THORAC SPINE 2VWS: CPT

## 2020-03-17 PROCEDURE — 63655 IMPLANT NEUROELECTRODES: CPT | Performed by: NEUROLOGICAL SURGERY

## 2020-03-17 PROCEDURE — 63685 INS/RPLC SPI NPG/RCVR POCKET: CPT | Performed by: PHYSICIAN ASSISTANT

## 2020-03-17 PROCEDURE — 63685 INS/RPLC SPI NPG/RCVR POCKET: CPT | Performed by: NEUROLOGICAL SURGERY

## 2020-03-17 PROCEDURE — C1822 GEN, NEURO, HF, RECHG BAT: HCPCS | Performed by: NEUROLOGICAL SURGERY

## 2020-03-17 PROCEDURE — C1778 LEAD, NEUROSTIMULATOR: HCPCS | Performed by: NEUROLOGICAL SURGERY

## 2020-03-17 DEVICE — SURGICAL LEAD KIT, 50CM
Type: IMPLANTABLE DEVICE | Site: SPINE THORACIC | Status: FUNCTIONAL
Brand: SURPASS™

## 2020-03-17 DEVICE — SENZA®  IPG KIT
Type: IMPLANTABLE DEVICE | Site: BUTTOCKS | Status: FUNCTIONAL
Brand: SENZA®

## 2020-03-17 RX ORDER — ONDANSETRON 2 MG/ML
4 INJECTION INTRAMUSCULAR; INTRAVENOUS EVERY 6 HOURS PRN
Status: DISCONTINUED | OUTPATIENT
Start: 2020-03-17 | End: 2020-03-17 | Stop reason: HOSPADM

## 2020-03-17 RX ORDER — ONDANSETRON 2 MG/ML
INJECTION INTRAMUSCULAR; INTRAVENOUS AS NEEDED
Status: DISCONTINUED | OUTPATIENT
Start: 2020-03-17 | End: 2020-03-17 | Stop reason: SURG

## 2020-03-17 RX ORDER — LIDOCAINE HYDROCHLORIDE AND EPINEPHRINE 10; 10 MG/ML; UG/ML
INJECTION, SOLUTION INFILTRATION; PERINEURAL AS NEEDED
Status: DISCONTINUED | OUTPATIENT
Start: 2020-03-17 | End: 2020-03-17 | Stop reason: HOSPADM

## 2020-03-17 RX ORDER — OXYCODONE HYDROCHLORIDE AND ACETAMINOPHEN 5; 325 MG/1; MG/1
1 TABLET ORAL EVERY 6 HOURS PRN
Qty: 20 TABLET | Refills: 0 | Status: SHIPPED | OUTPATIENT
Start: 2020-03-17 | End: 2020-03-22

## 2020-03-17 RX ORDER — BUPIVACAINE HYDROCHLORIDE AND EPINEPHRINE 5; 5 MG/ML; UG/ML
INJECTION, SOLUTION PERINEURAL AS NEEDED
Status: DISCONTINUED | OUTPATIENT
Start: 2020-03-17 | End: 2020-03-17 | Stop reason: HOSPADM

## 2020-03-17 RX ORDER — GABAPENTIN 300 MG/1
300 CAPSULE ORAL ONCE
Status: COMPLETED | OUTPATIENT
Start: 2020-03-17 | End: 2020-03-17

## 2020-03-17 RX ORDER — SODIUM CHLORIDE 9 MG/ML
100 INJECTION, SOLUTION INTRAVENOUS CONTINUOUS
Status: DISCONTINUED | OUTPATIENT
Start: 2020-03-17 | End: 2020-03-17 | Stop reason: HOSPADM

## 2020-03-17 RX ORDER — SUCCINYLCHOLINE/SOD CL,ISO/PF 100 MG/5ML
SYRINGE (ML) INTRAVENOUS AS NEEDED
Status: DISCONTINUED | OUTPATIENT
Start: 2020-03-17 | End: 2020-03-17 | Stop reason: SURG

## 2020-03-17 RX ORDER — CEFAZOLIN SODIUM 2 G/50ML
2000 SOLUTION INTRAVENOUS ONCE
Status: COMPLETED | OUTPATIENT
Start: 2020-03-17 | End: 2020-03-17

## 2020-03-17 RX ORDER — ACETAMINOPHEN 160 MG
TABLET,DISINTEGRATING ORAL AS NEEDED
Status: DISCONTINUED | OUTPATIENT
Start: 2020-03-17 | End: 2020-03-17 | Stop reason: HOSPADM

## 2020-03-17 RX ORDER — PROPOFOL 10 MG/ML
INJECTION, EMULSION INTRAVENOUS AS NEEDED
Status: DISCONTINUED | OUTPATIENT
Start: 2020-03-17 | End: 2020-03-17 | Stop reason: SURG

## 2020-03-17 RX ORDER — MAGNESIUM HYDROXIDE 1200 MG/15ML
LIQUID ORAL AS NEEDED
Status: DISCONTINUED | OUTPATIENT
Start: 2020-03-17 | End: 2020-03-17 | Stop reason: HOSPADM

## 2020-03-17 RX ORDER — OXYCODONE HYDROCHLORIDE AND ACETAMINOPHEN 5; 325 MG/1; MG/1
1 TABLET ORAL EVERY 4 HOURS PRN
Status: DISCONTINUED | OUTPATIENT
Start: 2020-03-17 | End: 2020-03-17 | Stop reason: HOSPADM

## 2020-03-17 RX ORDER — MIDAZOLAM HYDROCHLORIDE 2 MG/2ML
INJECTION, SOLUTION INTRAMUSCULAR; INTRAVENOUS AS NEEDED
Status: DISCONTINUED | OUTPATIENT
Start: 2020-03-17 | End: 2020-03-17 | Stop reason: SURG

## 2020-03-17 RX ORDER — FENTANYL CITRATE/PF 50 MCG/ML
25 SYRINGE (ML) INJECTION
Status: DISCONTINUED | OUTPATIENT
Start: 2020-03-17 | End: 2020-03-17 | Stop reason: HOSPADM

## 2020-03-17 RX ORDER — METHOCARBAMOL 500 MG/1
500 TABLET, FILM COATED ORAL EVERY 6 HOURS PRN
Status: DISCONTINUED | OUTPATIENT
Start: 2020-03-17 | End: 2020-03-17 | Stop reason: HOSPADM

## 2020-03-17 RX ORDER — ACETAMINOPHEN 325 MG/1
975 TABLET ORAL ONCE
Status: COMPLETED | OUTPATIENT
Start: 2020-03-17 | End: 2020-03-17

## 2020-03-17 RX ORDER — CHLORHEXIDINE GLUCONATE 0.12 MG/ML
15 RINSE ORAL ONCE
Status: COMPLETED | OUTPATIENT
Start: 2020-03-17 | End: 2020-03-17

## 2020-03-17 RX ORDER — SODIUM CHLORIDE, SODIUM LACTATE, POTASSIUM CHLORIDE, CALCIUM CHLORIDE 600; 310; 30; 20 MG/100ML; MG/100ML; MG/100ML; MG/100ML
75 INJECTION, SOLUTION INTRAVENOUS CONTINUOUS
Status: DISCONTINUED | OUTPATIENT
Start: 2020-03-17 | End: 2020-03-17 | Stop reason: HOSPADM

## 2020-03-17 RX ORDER — DEXAMETHASONE SODIUM PHOSPHATE 10 MG/ML
INJECTION, SOLUTION INTRAMUSCULAR; INTRAVENOUS AS NEEDED
Status: DISCONTINUED | OUTPATIENT
Start: 2020-03-17 | End: 2020-03-17 | Stop reason: SURG

## 2020-03-17 RX ORDER — ROCURONIUM BROMIDE 10 MG/ML
INJECTION, SOLUTION INTRAVENOUS AS NEEDED
Status: DISCONTINUED | OUTPATIENT
Start: 2020-03-17 | End: 2020-03-17 | Stop reason: SURG

## 2020-03-17 RX ORDER — PROPOFOL 10 MG/ML
INJECTION, EMULSION INTRAVENOUS CONTINUOUS PRN
Status: DISCONTINUED | OUTPATIENT
Start: 2020-03-17 | End: 2020-03-17 | Stop reason: SURG

## 2020-03-17 RX ORDER — FENTANYL CITRATE 50 UG/ML
INJECTION, SOLUTION INTRAMUSCULAR; INTRAVENOUS AS NEEDED
Status: DISCONTINUED | OUTPATIENT
Start: 2020-03-17 | End: 2020-03-17 | Stop reason: SURG

## 2020-03-17 RX ORDER — CEPHALEXIN 500 MG/1
500 CAPSULE ORAL EVERY 6 HOURS SCHEDULED
Qty: 20 CAPSULE | Refills: 0 | Status: SHIPPED | OUTPATIENT
Start: 2020-03-17 | End: 2020-03-22

## 2020-03-17 RX ADMIN — GABAPENTIN 300 MG: 300 CAPSULE ORAL at 07:05

## 2020-03-17 RX ADMIN — FENTANYL CITRATE 50 MCG: 50 INJECTION, SOLUTION INTRAMUSCULAR; INTRAVENOUS at 08:09

## 2020-03-17 RX ADMIN — DEXAMETHASONE SODIUM PHOSPHATE 4 MG: 10 INJECTION, SOLUTION INTRAMUSCULAR; INTRAVENOUS at 08:14

## 2020-03-17 RX ADMIN — PROPOFOL 100 MCG/KG/MIN: 10 INJECTION, EMULSION INTRAVENOUS at 07:39

## 2020-03-17 RX ADMIN — ACETAMINOPHEN 975 MG: 325 TABLET ORAL at 07:05

## 2020-03-17 RX ADMIN — CEFAZOLIN SODIUM 2000 MG: 2 SOLUTION INTRAVENOUS at 07:30

## 2020-03-17 RX ADMIN — FENTANYL CITRATE 50 MCG: 50 INJECTION, SOLUTION INTRAMUSCULAR; INTRAVENOUS at 08:50

## 2020-03-17 RX ADMIN — PROPOFOL 200 MG: 10 INJECTION, EMULSION INTRAVENOUS at 07:33

## 2020-03-17 RX ADMIN — CHLORHEXIDINE GLUCONATE 0.12% ORAL RINSE 15 ML: 1.2 LIQUID ORAL at 07:05

## 2020-03-17 RX ADMIN — FENTANYL CITRATE 50 MCG: 50 INJECTION, SOLUTION INTRAMUSCULAR; INTRAVENOUS at 08:13

## 2020-03-17 RX ADMIN — Medication 100 MG: at 07:35

## 2020-03-17 RX ADMIN — PROPOFOL 50 MG: 10 INJECTION, EMULSION INTRAVENOUS at 08:25

## 2020-03-17 RX ADMIN — ONDANSETRON 4 MG: 2 INJECTION INTRAMUSCULAR; INTRAVENOUS at 09:30

## 2020-03-17 RX ADMIN — PROPOFOL 30 MG: 10 INJECTION, EMULSION INTRAVENOUS at 08:51

## 2020-03-17 RX ADMIN — ROCURONIUM BROMIDE 5 MG: 10 INJECTION, SOLUTION INTRAVENOUS at 07:32

## 2020-03-17 RX ADMIN — FENTANYL CITRATE 50 MCG: 50 INJECTION, SOLUTION INTRAMUSCULAR; INTRAVENOUS at 08:39

## 2020-03-17 RX ADMIN — SODIUM CHLORIDE, SODIUM LACTATE, POTASSIUM CHLORIDE, AND CALCIUM CHLORIDE 75 ML/HR: .6; .31; .03; .02 INJECTION, SOLUTION INTRAVENOUS at 07:06

## 2020-03-17 RX ADMIN — PROPOFOL 30 MG: 10 INJECTION, EMULSION INTRAVENOUS at 08:15

## 2020-03-17 RX ADMIN — FENTANYL CITRATE 100 MCG: 50 INJECTION, SOLUTION INTRAMUSCULAR; INTRAVENOUS at 07:32

## 2020-03-17 RX ADMIN — MIDAZOLAM 2 MG: 1 INJECTION INTRAMUSCULAR; INTRAVENOUS at 07:32

## 2020-03-17 NOTE — ANESTHESIA PREPROCEDURE EVALUATION
Review of Systems/Medical History  Patient summary reviewed  Chart reviewed      Cardiovascular  Exercise tolerance (METS): >4,  Valvular heart disease , mitral valve prolapse,    Pulmonary  Negative pulmonary ROS Smoker ,   Comment: Med marijuana/vaping     GI/Hepatic  Negative GI/hepatic ROS          Negative  ROS        Endo/Other  History of thyroid disease , hypothyroidism,      GYN  Negative gynecology ROS          Hematology  Negative hematology ROS      Musculoskeletal  Back pain , lumbar pain,   Arthritis     Neurology    Paresthesias,    Psychology   Depression ,              Physical Exam    Airway    Mallampati score: II  TM Distance: >3 FB  Neck ROM: full     Dental   No notable dental hx     Cardiovascular  Rhythm: regular, Rate: normal, Cardiovascular exam normal    Pulmonary  Pulmonary exam normal Breath sounds clear to auscultation,     Other Findings        Anesthesia Plan  ASA Score- 2     Anesthesia Type- general with ASA Monitors  Additional Monitors:   Airway Plan: ETT  Plan Factors-    Induction- intravenous  Postoperative Plan- Plan for postoperative opioid use  Informed Consent- Anesthetic plan and risks discussed with patient  I personally reviewed this patient with the CRNA  Discussed and agreed on the Anesthesia Plan with the CRNA  Aurelio Khan

## 2020-03-17 NOTE — ANESTHESIA POSTPROCEDURE EVALUATION
Post-Op Assessment Note    CV Status:  Stable  Pain Score: 0    Pain management: adequate     Mental Status:  Alert and awake   Hydration Status:  Euvolemic   PONV Controlled:  Controlled   Airway Patency:  Patent   Post Op Vitals Reviewed: Yes      Staff: CRNA           BP      Temp (P) 98 °F (36 7 °C) (03/17/20 1005)    Pulse     Resp (P) 18 (03/17/20 1005)    SpO2

## 2020-03-18 ENCOUNTER — TELEPHONE (OUTPATIENT)
Dept: NEUROSURGERY | Facility: CLINIC | Age: 45
End: 2020-03-18

## 2020-03-18 NOTE — TELEPHONE ENCOUNTER
1st attempt - Called Jason Avina on primary contact number after surgery yesterday to check in on recovery and provide post surgical instructions  Got voicemail, left message to callback  Will make another attempt if no callback is received

## 2020-03-19 NOTE — TELEPHONE ENCOUNTER
Spoke with Estefani Roberts to see how she is doing after surgery 3/17/2020  She reports that she is doing well overall and denies any incisional issues or fevers  Bruising around battery  Advised that after three days she may take a shower and gently wash the surgical site with soap and water  Use clean wash cloth, towels, and clothing  Do not submerge in water until cleared by the surgeon  Do not apply any creams, ointments, or lotions to the site  Patient has not moved her bowels since the surgery  Encouraged her to take a stool softener, and add Miralax daily if bowels have not moved by tomorrow  Patient educated to drink plenty of water and instructed to ambulate as tolerated to help with constipation and prevent blood clots  she has no further questions at this time  Verified date/time/location of her upcoming POV on 03/31/2020 advised her to call the office with any further questions or concerns, or if any incisional issues or fevers would arise  Patient was appreciative for the call

## 2020-03-31 ENCOUNTER — TRANSCRIBE ORDERS (OUTPATIENT)
Dept: NEUROSURGERY | Facility: CLINIC | Age: 45
End: 2020-03-31

## 2020-03-31 ENCOUNTER — TELEMEDICINE (OUTPATIENT)
Dept: NEUROSURGERY | Facility: CLINIC | Age: 45
End: 2020-03-31

## 2020-03-31 DIAGNOSIS — Z98.890 STATUS POST SURGERY: Primary | ICD-10-CM

## 2020-03-31 PROCEDURE — 99024 POSTOP FOLLOW-UP VISIT: CPT

## 2020-03-31 NOTE — PROGRESS NOTES
Virtual Brief Visit    Problem List Items Addressed This Visit     None      Visit Diagnoses     Status post surgery    -  Primary                Reason for visit is: 2 week post-op visit    Encounter provider Jah Cook RN    Provider located at [address] [place] [id number]      Recent Visits  No visits were found meeting these conditions  Showing recent visits within past 7 days and meeting all other requirements     Today's Visits  Date Type Provider Dept   03/31/20 Telemedicine Jah Cook RN  [facility]   Showing today's visits and meeting all other requirements     Future Appointments  No visits were found meeting these conditions  Showing future appointments within next 150 days and meeting all other requirements        After connecting through telephone, the patient was identified by name and date of birth  Tyra Bales was informed that this is a telemedicine visit and that the visit is being conducted through telephone  My office door was closed  No one else was in the room  She acknowledged consent and understanding of privacy and security of the video platform  The patient has agreed to participate and understands they can discontinue the visit at any time  Patient is aware this is a billable service  Alexander Beaulieu is a 39 y o  female who is 2 weeks s/p: Insertion of Nevro thoracic spinal cord stimulator electrode via T10-T11 and T9-T10 laminotomy and left buttock implantable pulse generator  She denies any incisional issues or fevers at this time  Incisions are without erythema, edema or drainage  Images were sent via email to review (see below)  She reports that she is doing well overall and he denies any weakness numbness and tingling   She has some muscle spasms which are worse when she "overdoes it" but the muscle relaxer is helping control them for the most part   She is only using pain medication about 1x per day at this point and she has about 2 percocet left  Once she finishes those she is going to resume her tramadol if needed and call us if her pain is not well controlled  Patient is to slowly increase her level of activity, but should avoid strenuous activity as well as heavy lifting or bending/twisting at the waist   Patient is to follow up in 4 weeks with an x-ray which she was informed of over the telephone  States that she was able to reach out to the [facility] rep via telephone and he will be contacting her today to set up stimulator  She is to call the office in the meantime with any incisional issues, fevers or any questions or concerns that she may have                      Past Medical History:   Diagnosis Date    Chronic pain disorder     Depression     Hypothyroidism     Mitral valve prolapse        Past Surgical History:   Procedure Laterality Date    KNEE ARTHROSCOPY      KNEE ARTHROSCOPY W/ ACL RECONSTRUCTION Left     MYOMECTOMY      OK SURG IMPLNT NEUROELECT,EPIDURAL Left 3/17/2020    Procedure: Insertion of thoracic spinal cord stimulator electrode via laminotomy and placement of left buttock implantable pulse generator;  Surgeon: Juan Freed MD;  Location:  MAIN OR;  Service: Neurosurgery    REPAIR ANKLE LIGAMENT Right     THYROIDECTOMY         Current Outpatient Medications   Medication Sig Dispense Refill    ARMOUR THYROID 60 MG tablet Take 60 mg by mouth daily      BIOTIN PO Take by mouth daily      DULoxetine (CYMBALTA) 60 mg delayed release capsule Take 60 mg by mouth 2 (two) times a day      glucosamine-chondroitin 500-400 MG tablet Take 1 tablet by mouth daily      pregabalin (LYRICA) 75 mg capsule Take 75 mg by mouth 2 (two) times a day      cyclobenzaprine (FLEXERIL) 10 mg tablet Take 10 mg by mouth 3 (three) times a day as needed for muscle spasms       No current facility-administered medications for this visit  Allergies   Allergen Reactions    Meperidine Other (See Comments)     Other reaction(s): blood pressure drops extrememly low,  Annotation - [id number]: passed out       Review of Systems   Constitutional: Negative  HENT: Negative  Eyes: Negative  Respiratory: Negative  Cardiovascular: Negative  Gastrointestinal: Negative  Endocrine: Negative  Genitourinary: Negative  Musculoskeletal:        Mild incisional pain   Skin: Negative  Allergic/Immunologic: Negative  Neurological: Negative  Hematological: Negative  Psychiatric/Behavioral: Negative  I spent 20 minutes with the patient during this visit

## 2020-04-24 ENCOUNTER — APPOINTMENT (OUTPATIENT)
Dept: RADIOLOGY | Facility: CLINIC | Age: 45
End: 2020-04-24
Payer: OTHER MISCELLANEOUS

## 2020-04-24 DIAGNOSIS — Z98.890 STATUS POST SURGERY: ICD-10-CM

## 2020-04-24 PROCEDURE — 72070 X-RAY EXAM THORAC SPINE 2VWS: CPT

## 2020-05-01 ENCOUNTER — TELEMEDICINE (OUTPATIENT)
Dept: NEUROSURGERY | Facility: CLINIC | Age: 45
End: 2020-05-01

## 2020-05-01 DIAGNOSIS — Z96.89 STATUS POST INSERTION OF SPINAL CORD STIMULATOR: ICD-10-CM

## 2020-05-01 DIAGNOSIS — G89.4 CHRONIC PAIN SYNDROME: Primary | ICD-10-CM

## 2020-05-01 PROCEDURE — 99024 POSTOP FOLLOW-UP VISIT: CPT | Performed by: NEUROLOGICAL SURGERY

## 2023-12-21 ENCOUNTER — OFFICE VISIT (OUTPATIENT)
Dept: NEUROLOGY | Facility: CLINIC | Age: 48
End: 2023-12-21
Payer: COMMERCIAL

## 2023-12-21 VITALS
WEIGHT: 170 LBS | DIASTOLIC BLOOD PRESSURE: 82 MMHG | OXYGEN SATURATION: 98 % | RESPIRATION RATE: 16 BRPM | BODY MASS INDEX: 27.32 KG/M2 | SYSTOLIC BLOOD PRESSURE: 120 MMHG | HEART RATE: 76 BPM | TEMPERATURE: 98.1 F | HEIGHT: 66 IN

## 2023-12-21 DIAGNOSIS — G43.009 MIGRAINE WITHOUT AURA AND WITHOUT STATUS MIGRAINOSUS, NOT INTRACTABLE: ICD-10-CM

## 2023-12-21 DIAGNOSIS — G44.86 CERVICOGENIC HEADACHE: Primary | ICD-10-CM

## 2023-12-21 DIAGNOSIS — G43.709 CHRONIC MIGRAINE WITHOUT AURA WITHOUT STATUS MIGRAINOSUS, NOT INTRACTABLE: ICD-10-CM

## 2023-12-21 PROCEDURE — 3008F BODY MASS INDEX DOCD: CPT | Performed by: NURSE PRACTITIONER

## 2023-12-21 PROCEDURE — 99204 OFFICE O/P NEW MOD 45 MIN: CPT | Performed by: NURSE PRACTITIONER

## 2023-12-21 RX ORDER — GLUC/MSM/COLGN2/HYAL/ANTIARTH3 375-375-20
1 TABLET ORAL DAILY
COMMUNITY

## 2023-12-21 RX ORDER — BIOTIN 10 MG
TABLET ORAL DAILY
COMMUNITY

## 2023-12-21 RX ORDER — SUMATRIPTAN SUCCINATE 50 MG/1
50 TABLET ORAL
COMMUNITY
Start: 2023-03-02 | End: 2023-12-21 | Stop reason: SDUPTHER

## 2023-12-21 RX ORDER — CYCLOBENZAPRINE HCL 10 MG
10 TABLET ORAL
COMMUNITY
End: 2024-12-17 | Stop reason: ALTCHOICE

## 2023-12-21 RX ORDER — TRAMADOL HYDROCHLORIDE 50 MG/1
TABLET ORAL
COMMUNITY
Start: 2023-12-06

## 2023-12-21 RX ORDER — RIMEGEPANT SULFATE 75 MG/75MG
75 TABLET, ORALLY DISINTEGRATING ORAL ONCE
Qty: 16 TABLET | Refills: 3 | Status: SHIPPED | OUTPATIENT
Start: 2023-12-21 | End: 2023-12-21

## 2023-12-21 RX ORDER — THYROID 60 MG/1
60 TABLET ORAL DAILY
COMMUNITY
Start: 2023-12-18

## 2023-12-21 RX ORDER — PREGABALIN 150 MG/1
CAPSULE ORAL
COMMUNITY

## 2023-12-21 RX ORDER — SUMATRIPTAN SUCCINATE 50 MG/1
TABLET ORAL
Qty: 12 TABLET | Refills: 3 | Status: SHIPPED | OUTPATIENT
Start: 2023-12-21 | End: 2024-03-29 | Stop reason: SDUPTHER

## 2023-12-21 RX ORDER — DULOXETIN HYDROCHLORIDE 60 MG/1
60 CAPSULE, DELAYED RELEASE ORAL
COMMUNITY
End: 2024-12-17 | Stop reason: DRUGHIGH

## 2023-12-21 NOTE — PATIENT INSTRUCTIONS
Basic neck exercises for daily use:      - Neck pathology and poor posture, with straightening of the normal cervical lordosis, can cause headaches.  Tightening of the neck muscles can irritate the nerves in the occipital region of the head and cause or worsen head pain. Thus neck strengthening and relaxation exercises, can help improve this particular pain. It is importance to have good posture for improving shoulder, neck, and head pain.    - Here are some exercises which should take 5 minutes:     1. Standing, drop your head to one side while continuing to look ahead. Hold for 10 seconds then swap sides. Repeat twice more each side. To increase the stretch, drop the opposite shoulder.    2. Standing again, lower your chin to your chest, hold for 10 and then look up to the ceiling and hold for 10. Repeat twice more.     3. Next, standing straight again, look over your right shoulder and hold firm for 10 seconds, then over your left shoulder for 10. Repeat this 3 times.     4. Finally, while sitting upright, bring your head forward and hold for 10, then all the way back and hold for 10.    If this simple exercise does not help improve the posture, we will consider formal physical therapy in the future.       Importance of Healthy Sleep:    Behavioral sleep changes can promote restful, regular sleep and reduce headache. Simple changes like establishing consistent sleep and wake-up times, as well as getting between 7 and 8 hours of sleep a day, can make a world of difference. Experts also recommend avoiding substances that impair sleep, like caffeine, nicotine and alcohol, and also suggest winding down before bed to prevent sleep problems. To read more go to https://americanmigrainefoundation.org/resource-library/sleep/    Medication overuse headaches:     - Medication overuse headache (MOH) and analgesic overuse can negate the effectiveness of headache preventive measures.  Avoid medications with narcotics,  "barbiturates, or caffeine in them as these can cause rebound headaches after very few doses and can interfere with other headache medicine efficacy. Taking  any acute/abortive over the counter medication or prescription drugs for more than 2-3 days a week can cause medication overuse headache.     Preventive therapy for headaches:     -Over-the-counter supplements: to decrease intensity and frequency of migraines    - Magnesium Oxide 400 mg a day at bedtime .  If any diarrhea or upset stomach, decrease dose  as tolerated    - Vitamin B2 200 mg a day in AM . May cause the urine to turn yellow which is normal for B 2 to do and is not a sign that you are dehydrated      Abortive therapy for headaches:     Nurtec 75 mg ODT - every other day that will serve as your prevention and rescue       Work up:     Vit B12  Vit D    TSH, T4  Iron/folate     Cervical spine MRI without contrast - would like to order - we will proceed with exercise first and then re evaluate         Headache management instructions    - When patient has a moderate to severe headache, they should seek rest, initiate relaxation and apply cold compresses to the head.   - Maintain regular sleep schedule. Adults need at least 7-8 hours of uninterrupted a night.   - Limit over the counter medications such as Tylenol, Ibuprofen, Aleve, Excedrin. (No more than 2- 3 times a week or max 10 a month).  - Maintain headache diary.  Free [place] for a smart phone, which can be used is \"Migraine karen\"    - Limit caffeine to 1-2 cups 8 to 16 oz a day or less.  - Avoid dietary trigger. (aged cheese, peanuts, MSG, aspartame and nitrates).  - Patient is to have regular frequent meals to prevent headache onset.    - Please drink at least 64 ounces of water a day to help remain hydrated.        Cognitive behavioral therapy (CBT) is a common type of talk therapy (psychotherapy) were you work with a psychotherapist or therapist . CBT helps you become aware of inaccurate or " negative thinking so you can view challenging situations more clearly and respond to them in a more effective way. CBT can be a very helpful tool ? either alone or in combination with other therapies ? in treating mental health disorders (depression, post-traumatic stress disorder (PTSD) or an eating disorder) and chronic pain. CBT can be an effective tool to help anyone learn how to better manage stressful life situations and pain. In some cases, CBT is most effective when it's combined with other treatments, such as antidepressants or other medications.  You can start yourself by down loading the cassia: Curable      Mindfulness/Meditation for Treating Migraine  Many people believe that stress is a major trigger for their migraine. This is where mindfulness and meditation can come into play, as they have been known to help reduce migraine severity, duration and acute pain medication use. It may also help to relieve stress and anxiety while improving feelings of well being.    Biofeedback involves becoming more aware of the changes that occur in the body and learning how to exert control over generally involuntary functions. Biofeedback allows you to see your vitals in real-time and learn how to stabilize them on your own. There is great evidence that biofeedback can reduce the frequency, intensity, and duration of migraine and tension-type headache.  When you're stressed, you may notice elevated heart rates, tightened muscles, and sweating.  During biofeedback, you can see these changes on a monitor, then a therapist teaches you exercises to help manage these changes.    Yoga/Benny Chi for Treating Migraine  The kind of mind/body therapy that yoga can provide may help create relief from migraine. Keeping up with yoga consistently can reduce headache frequency, intensity and duration, so it's important to practice regularly if you plan to use it as a complementary migraine treatment. However, certain types of yoga such  as “hot yoga” may be uncomfortable for people with migraine. Others, such as “restorative yoga,” may be tolerable even for a patient with chronic migraine.  Benny Chi can also have a similar benefit for patients with migraine. Specifically, it can help improve balance, which can be very useful for those with vestibular symptoms or vestibular migraine.    Acupuncture for Treating Migraine  A traditional Chinese medicine, acupuncture is reported to increase the release of serotonin, dopamine and other chemicals that may help to treat chronic pain, and can be helpful in preventing episodic migraine. There are, however, conflicting results on studies in acupuncture as a treatment for migraine.    Exercising for migraineurs:  Regular exercise can reduce the frequency and intensity of headaches and migraines. When one exercises, the body releases endorphins, which are the body's natural painkillers. Exercise reduces stress and helps individuals to sleep at night. Exercising at least 30 to 40 minutes 3 times a week is sufficient for most patients.   When exercising, follow this plan to prevent headaches:  - First, stay hydrated before, during, and after exercise.    - Second part of the exercise plan is to eat sufficient food about an hour and a half before you exercise. Exercise causes one's blood sugar level to decrease, and it is important to have a source of energy.   - Final part of the exercise plan is to warm-up. Do not jump into sudden, vigorous exercise if that triggers a headache or migraine.   To read more go to https://americanmigrainefoundation.org/resource-library/effects-of-exercise-on-headaches-and-migraines/

## 2023-12-21 NOTE — PROGRESS NOTES
Beatriz Dowling is a 48 y.o. old female with hx of low back pain with  spinal stimulator put in 2020, with improvement , concussion at age 8, thyroidectomy in 2010 due to adenoma,  presenting today for evaluation of chronic migraines since 2022 , most recent exacerbation.     The headaches started about 2 years ago , prior to that was having mild headaches sinus related, but not with  migraines features. Unknown precipitating factors or triggers she can think of, possibility of hormonal trigger , she states she is in menopause.     Location :     Right suboccipital   Right temple   Right parietal   Holocephalic  Right semispinalis     Quality:    Throbbing   Starts as ache and progresses     Severity:    4/10 starts   Gets to 10/10 within 2 hrs     Frequency :    At least once a week   Sometimes lasting 48 hrs       Number of days missed per month because of headaches:     Work (or school) days: 10 days in the last 3 months       Duration:    Up to 48 hrs     Associated symptoms:     - Nausea    - Photophobia   Phonophobia   Osmophobia - once with really severe     - Stiff or sore neck      light headed     - Problems with concentration     - Blurred vision- both eyes   - Tinnitus - left ear - intensifies with migraine     Better with lying down   - Prefer to be in a cool, quiet, dark room     Shivering due to pain intensity     Previsouly tried medications and procedures / devices:     Topamax (400 mg daily - side effects )     Sumatriptan   Excedrin migraine   Magnesium oxide     Have you had trigger point injection performed and how often? no  Have you had Botox injection performed and how often? no  Have you had epidural injections or transforaminal injections performed? Yes for lower back in 2019       Alternative therapies used in the past for headaches? physical therapy, acupuncture, chiropractor (neck and low back )       Triggers:       Fatigue   Stress  Dehydration   Weather   Red wine     Alleviating  "factors:     Sumatriptan 50 mg - two doses sometimes   Excedrin migraine     Aura:    None     Previous testing :     Narrative   05/11/2023 9:21 AM EDT    History : Headache     Procedure: MRI of the Brain with and without IV contrast    Protocol altered due to spinal cord implant  Contrast: 15ml Dotarem    Comparison: None    FINDINGS:     Brain parenchyma: Grossly unremarkable     Ventricles: Age-appropriate     Extra-axial spaces: Age-appropriate     Intracranial Hemorrhage: None     Midline shift: None     Calvarium and Skull base: Unremarkable     Orbits: Grossly unremarkable     Sella: Likely within the range of normal with some mild superior concavity to  the pituitary     Paranasal Sinuses: Grossly patent    Mastoids: Grossly patent               Past Medical History:  has no past medical history on file.  Past Surgical History:  has no past surgical history on file.  Social History:        Physical Exam:  Visit Vitals  /82 (BP Location: Left upper arm, Patient Position: Sitting)   Pulse 76   Temp 36.7 °C (98.1 °F)   Resp 16   Ht 1.676 m (5' 6\")   Wt 77.1 kg (170 lb)   SpO2 98%   BMI 27.44 kg/m²       General Appearance:  Alert, no distress, appears stated age   Head:  Normocephalic, without obvious abnormality, atraumatic   Eyes:  PERRL, conjunctiva/corneas clear, EOM's intact, fundi benign, both eyes                                       Extremities: Extremities normal, atraumatic, no cyanosis or edema    Musculoskeletal: No injury or deformity  Muscle strength in upper and lower extremities: No joint swelling or inflammation . Full range of motions in all joints. Equal 5/5 strength in BL upper and lower extremities.       Slight limited range of motion with left head turn      Skin: Skin color, texture, turgor normal, no rashes or lesions   Behavior/Emotional: Appropriate, cooperative   Neurologic Exam:  Alert and oriented. Attention, concentration, memory, language, visual spatial orientation, " executive function is normal.    Pupils equal round and reactive to light. Extraocular movement full with normal pursuit + saccades. No nystagmus noted.     Facial strength and sensation is normal. Healing normal.  The tongue and uvula were midline. No dysarthria or dysphagia.     Strength was 5/5 in bulbar, axial + extremity muscles.There was normal bulk and tone with no abnormal movements.   The sensory examination was normal to touch, temperature and pain, vibration and proprioception. There was no dysmetria or cerebellar signs.     The gait was narrow based.     Reflexes were +3 hyperreflexic brachioradialis bilateral ,  and symmetric.            Cranial nerves    II: visual fields full to confrontation. Optic discs appear normal  III, IV,VI: full extraoccular movements without nystagmus.  Pupils equal, round, reactive to light.  V: facial sensation normal to light touch and pinprick bilaterally  VII: facial strength normal and symmetric  VIII: intact to finger rub bilaterally;  IX, X: palate lifts in the midline  XI: sternocleidomastoid strength normal and symmetric  XII: tongue protrudes in the midline, no atrophy or fasciculations.    Test Coordination : normal     Diagnoses and all orders for this visit:    Cervicogenic headache    Migraine without aura and without status migrainosus, not intractable  -     rimegepant (NURTEC ODT) 75 mg tablet,disintegrating; Take 1 tablet (75 mg total) by mouth once for 1 dose. One at the onset of migraine, max one dose in 24 hours. May take one ODT every other day for prevention  -     SUMAtriptan (IMITREX) 50 mg tablet; Take one tab at onset of migraine , may repeat in 2 hrs as needed, max 2 tabs per day    Chronic migraine without aura without status migrainosus, not intractable  -     Vitamin B12; Future  -     Vitamin D 25 hydroxy; Future  -     TSH; Future  -     T4, free; Future  -     Iron and TIBC; Future  -     rimegepant (NURTEC ODT) 75 mg tablet,disintegrating;  Take 1 tablet (75 mg total) by mouth once for 1 dose. One at the onset of migraine, max one dose in 24 hours. May take one ODT every other day for prevention        Basic neck exercises for daily use:      - Neck pathology and poor posture, with straightening of the normal cervical lordosis, can cause headaches.  Tightening of the neck muscles can irritate the nerves in the occipital region of the head and cause or worsen head pain. Thus neck strengthening and relaxation exercises, can help improve this particular pain. It is importance to have good posture for improving shoulder, neck, and head pain.    - Here are some exercises which should take 5 minutes:     1. Standing, drop your head to one side while continuing to look ahead. Hold for 10 seconds then swap sides. Repeat twice more each side. To increase the stretch, drop the opposite shoulder.    2. Standing again, lower your chin to your chest, hold for 10 and then look up to the ceiling and hold for 10. Repeat twice more.     3. Next, standing straight again, look over your right shoulder and hold firm for 10 seconds, then over your left shoulder for 10. Repeat this 3 times.     4. Finally, while sitting upright, bring your head forward and hold for 10, then all the way back and hold for 10.    If this simple exercise does not help improve the posture, we will consider formal physical therapy in the future.         Cervical spine MRI without contrast - would like to order - we will proceed with exercise first and then re evaluate

## 2023-12-22 DIAGNOSIS — G43.709 CHRONIC MIGRAINE WITHOUT AURA WITHOUT STATUS MIGRAINOSUS, NOT INTRACTABLE: Primary | ICD-10-CM

## 2024-01-04 PROBLEM — M54.2 CERVICALGIA: Status: ACTIVE | Noted: 2022-03-30

## 2024-01-04 PROBLEM — M62.838 MUSCLE SPASM: Status: ACTIVE | Noted: 2022-03-30

## 2024-01-04 PROBLEM — G89.4 CHRONIC PAIN SYNDROME: Status: ACTIVE | Noted: 2020-02-06

## 2024-01-04 PROBLEM — M79.18 MYOFASCIAL MUSCLE PAIN: Status: ACTIVE | Noted: 2022-03-30

## 2024-01-04 PROBLEM — M54.16 LUMBAR RADICULOPATHY: Status: ACTIVE | Noted: 2020-02-06

## 2024-01-04 PROBLEM — M54.9 BACK PAIN: Status: ACTIVE | Noted: 2022-02-28

## 2024-01-04 PROBLEM — E89.0 POSTSURGICAL HYPOTHYROIDISM: Status: ACTIVE | Noted: 2022-02-08

## 2024-01-04 PROBLEM — Z96.89 STATUS POST INSERTION OF SPINAL CORD STIMULATOR: Status: ACTIVE | Noted: 2020-05-01

## 2024-01-04 LAB
25(OH)D3+25(OH)D2 SERPL-MCNC: 41.9 NG/ML (ref 30–100)
IRON SATN MFR SERPL: 16 % (ref 15–55)
IRON SERPL-MCNC: 48 UG/DL (ref 27–159)
T4 FREE SERPL-MCNC: 1.05 NG/DL (ref 0.82–1.77)
TIBC SERPL-MCNC: 299 UG/DL (ref 250–450)
TSH SERPL DL<=0.005 MIU/L-ACNC: 0.45 UIU/ML (ref 0.45–4.5)
UIBC SERPL-MCNC: 251 UG/DL (ref 131–425)
VIT B12 SERPL-MCNC: 1247 PG/ML (ref 232–1245)

## 2024-03-29 ENCOUNTER — TELEMEDICINE (OUTPATIENT)
Dept: NEUROLOGY | Facility: CLINIC | Age: 49
End: 2024-03-29
Payer: COMMERCIAL

## 2024-03-29 DIAGNOSIS — G43.009 MIGRAINE WITHOUT AURA AND WITHOUT STATUS MIGRAINOSUS, NOT INTRACTABLE: Primary | ICD-10-CM

## 2024-03-29 DIAGNOSIS — G43.709 CHRONIC MIGRAINE WITHOUT AURA WITHOUT STATUS MIGRAINOSUS, NOT INTRACTABLE: ICD-10-CM

## 2024-03-29 DIAGNOSIS — G24.3 ISOLATED CERVICAL DYSTONIA: ICD-10-CM

## 2024-03-29 PROCEDURE — 99214 OFFICE O/P EST MOD 30 MIN: CPT | Mod: 95 | Performed by: NURSE PRACTITIONER

## 2024-03-29 RX ORDER — SUMATRIPTAN SUCCINATE 50 MG/1
TABLET ORAL
Qty: 36 TABLET | Refills: 1 | Status: SHIPPED | OUTPATIENT
Start: 2024-03-29 | End: 2024-06-07 | Stop reason: SDUPTHER

## 2024-03-29 NOTE — PROGRESS NOTES
Verification of Patient Location:  The patient affirms they are currently located in the following state: [place]    Request for Consent:    Audio and Video Encounter   Hillary, my name is ALICIA Villarreal.  Before we proceed, can you please verify your identification by telling me your full name and date of birth?  Can you tell me who is in the room with you?    You and I are about to have a telemedicine check-in or visit because you have requested it.  This is a live video-conference.  I am a real person, speaking to you in real time.  There is no one else with me on the video-conference. I am not recording this conversation and I am asking you not to record it.  This telemedicine visit will be billed to your health insurance or you, if you are self-insured.  You understand you will be responsible for any copayments or coinsurances that apply to your telemedicine visit.  Communication platform used for this encounter:  Exchange [facility] Video Visit (Epic Video Client)       Before starting our telemedicine visit, I am required to get your consent for this virtual check-in or visit by telemedicine. Do you consent?      Patient Response to Request for Consent:  Yes    This encounter was conducted via Epic MyChart Video Visit functionality.  This visit did not require an in-person evaluation and was conducted remotely via telehealth systems without adversely affecting the quality of care delivered.    As part of this telemedicine visit, on the day of service, I spent 10 minutes reviewing Cursogram/Epic for all data/events relevant to this visit, composing this note, and conducting other visit follow up activities.    I spent 20 minutes directly evaluating and/or counseling and communicating with the patient or patient proxy.    The total duration of time spent on the day of service on this telemedicine encounter was 30 minutes.         Beatriz Dowling is a 49 y.o. old female presenting today for follow up. She  reports migraines  are still frequent not as intense.   Treats with Imitrex as needed and Nurtec every other day.   She reports still getting over being sick - she was out of commission with them , still having moderate to severe - at least 10-15 days per month       Location :      Right suboccipital   Right temple   Right parietal   Holocephalic  Right semispinalis      Quality:     Throbbing   Starts as ache and progresses      Severity:     4/10 starts   Gets to 10/10 within 2 hrs      Frequency :    At least 10-15 days of moderate to severe headaches per month and daily neck pain and tension      Number of days missed per month because of headaches:      Work (or school) days: 10 days in the last 3 months         Duration:     Up to 48 hrs      Associated symptoms:      - Nausea     - Photophobia   Phonophobia   Osmophobia - once with really severe      - Stiff or sore neck       light headed      - Problems with concentration      - Blurred vision- both eyes   - Tinnitus - left ear - intensifies with migraine      Better with lying down   - Prefer to be in a cool, quiet, dark room      Shivering due to pain intensity      Previsouly tried medications and procedures / devices:      Topamax (400 mg daily - side effects )      Currently on: Cymbalta 60 mg daily and Lyrica 150 mg     Sumatriptan   Excedrin migraine   Magnesium oxide    Flexeril   Methocarbamol    Have you had trigger point injection performed and how often? no  Have you had Botox injection performed and how often? no  Have you had epidural injections or transforaminal injections performed? Yes for lower back in 2019      Alternative therapies used in the past for headaches? physical therapy, acupuncture, chiropractor (neck and low back )         Triggers:         Fatigue   Stress  Dehydration   Weather   Red wine      Alleviating factors:      Sumatriptan 50 mg - two doses sometimes   Excedrin migraine      Aura:     None      Previous testing :       Narrative   05/11/2023 9:21 AM EDT    History : Headache     Procedure: MRI of the Brain with and without IV contrast    Protocol altered due to spinal cord implant  Contrast: 15ml Dotarem    Comparison: None    FINDINGS:     Brain parenchyma: Grossly unremarkable     Ventricles: Age-appropriate     Extra-axial spaces: Age-appropriate     Intracranial Hemorrhage: None     Midline shift: None     Calvarium and Skull base: Unremarkable     Orbits: Grossly unremarkable     Sella: Likely within the range of normal with some mild superior concavity to  the pituitary     Paranasal Sinuses: Grossly patent    Mastoids: Grossly patent            Result Notes         Component  Ref Range & Units 1/3/24 0800    Vitamin B12  232 - 1,245 pg/mL 1,247 High                     Component  Ref Range & Units 1/3/24 0800 Comments     Vitamin D, 25-Hydroxy  30.0 - 100.0 ng/mL 41.9  Vitamin D deficiency has been defined       0 Result Notes         Component  Ref Range & Units 1/3/24 0800    TSH  0.450 - 4.500 uIU/mL 0.445 Low                     Component  Ref Range & Units 1/3/24 0800     T4,Free(Direct)  0.82 - 1.77 ng/dL 1.05               Narrative           Component  Ref Range & Units 1/3/24 0800    Iron Bind.Cap.(TIBC)  250 - 450 ug/dL 299     UIBC  131 - 425 ug/dL 251     Iron, Serum  27 - 159 ug/dL 48     Iron Saturation  15 - 55 % 16                      Past Medical History:  has no past medical history on file.  Past Surgical History:  has no past surgical history on file.  Social History:    Diagnoses and all orders for this visit:    Migraine without aura and without status migrainosus, not intractable  -     SUMAtriptan (IMITREX) 50 mg tablet; Take one tab at onset of migraine , may repeat in 2 hrs as needed, max 2 tabs per day    Chronic migraine without aura without status migrainosus, not intractable  -     rimegepant (NURTEC ODT) 75 mg tablet,disintegrating; Take 1 tablet (75 mg total) by mouth as needed  (migraine). One at the onset of migraine, max one dose in 24 hours. May take one ODT every other day for prevention  -     onabotulinumtoxinA (BOTOX) 200 unit injection 200 Units    Isolated cervical dystonia    Other orders  -     onabotulinumtoxinA (BOTOX) 200 unit injection 200 Units      We will submit for 200 units Botox every 12 weeks to help with neck pain and chronic migraines     Continue Nurtec 75 mg ODT every other day     Imitrex 100 mg as needed at onset of migraine , may repeat in 2 hrs , max 200 mg per day

## 2024-05-10 ENCOUNTER — TELEPHONE (OUTPATIENT)
Dept: NEUROSURGERY | Facility: CLINIC | Age: 49
End: 2024-05-10
Payer: COMMERCIAL

## 2024-05-10 NOTE — TELEPHONE ENCOUNTER
Patient called into the neuro line and left a VM. Wants to discuss making appt for BOTOX. Please follow up.

## 2024-05-14 NOTE — TELEPHONE ENCOUNTER
Called patient and let her know she is approved for her botox treatments.     Pt is scheduled for July 1st with Mirella at 1pm.

## 2024-06-05 DIAGNOSIS — G43.709 CHRONIC MIGRAINE WITHOUT AURA WITHOUT STATUS MIGRAINOSUS, NOT INTRACTABLE: ICD-10-CM

## 2024-06-07 DIAGNOSIS — G43.009 MIGRAINE WITHOUT AURA AND WITHOUT STATUS MIGRAINOSUS, NOT INTRACTABLE: ICD-10-CM

## 2024-06-07 RX ORDER — SUMATRIPTAN SUCCINATE 50 MG/1
TABLET ORAL
Qty: 36 TABLET | Refills: 1 | Status: SHIPPED | OUTPATIENT
Start: 2024-06-07 | End: 2024-07-29

## 2024-07-01 ENCOUNTER — OFFICE VISIT (OUTPATIENT)
Dept: NEUROLOGY | Facility: CLINIC | Age: 49
End: 2024-07-01
Attending: NURSE PRACTITIONER
Payer: COMMERCIAL

## 2024-07-01 VITALS
OXYGEN SATURATION: 97 % | TEMPERATURE: 98 F | HEART RATE: 62 BPM | DIASTOLIC BLOOD PRESSURE: 80 MMHG | SYSTOLIC BLOOD PRESSURE: 120 MMHG | RESPIRATION RATE: 18 BRPM | WEIGHT: 175 LBS | BODY MASS INDEX: 28.25 KG/M2

## 2024-07-01 DIAGNOSIS — G43.709 CHRONIC MIGRAINE WITHOUT AURA WITHOUT STATUS MIGRAINOSUS, NOT INTRACTABLE: ICD-10-CM

## 2024-07-01 DIAGNOSIS — G24.3 ISOLATED CERVICAL DYSTONIA: ICD-10-CM

## 2024-07-01 PROCEDURE — 99999 PR OFFICE/OUTPT VISIT,PROCEDURE ONLY: CPT | Performed by: NURSE PRACTITIONER

## 2024-07-01 PROCEDURE — 64615 CHEMODENERV MUSC MIGRAINE: CPT | Performed by: NURSE PRACTITIONER

## 2024-07-01 RX ORDER — UBIDECARENONE 100 MG
100 CAPSULE ORAL DAILY
COMMUNITY

## 2024-07-01 NOTE — PROGRESS NOTES
Patient is first time for Botox injections.     Botox Injections   Botox lot number : [id number]  Expiration Date : 08/2026  NDC# 9988-6281-28    Patient informed and consent was obtained .   4 cc of Normal saline were added to one vial of Botox Type A resulting in 200 Units of Botox . After the patient consented to the procedure the following muscles were injected after cleaning the overlying skin with alcohol.  New FDA mandated warnings provided to the patient with instructions to seek medical attention for difficulty swallowing or breathing .     Frontalis 10 units right and left (divided in 2 areas each side )    / Glabellar 5 units right and left   Procerus 5 units  Temporalis 20 units right and left (divided in 4 areas each side )   Trapezius 15 units right and left (divided in 3 areas each side )   Cervical Paraspinals 10 units right and left (divided in 2 areas each side )   Occipitalis 15 units right and left (divided in 3 areas each side )       A total of 155 units was used and 45 units wasted.     The patient tolerated the procedure well.     Diagnoses and all orders for this visit:    Isolated cervical dystonia  -     [facility] Botulinum Toxin Injection Appointment Request  -     onabotulinumtoxinA (BOTOX) 200 unit injection 200 Units    Chronic migraine without aura without status migrainosus, not intractable  -     [facility] Botulinum Toxin Injection Appointment Request  -     onabotulinumtoxinA (BOTOX) 200 unit injection 200 Units

## 2024-07-29 DIAGNOSIS — G43.009 MIGRAINE WITHOUT AURA AND WITHOUT STATUS MIGRAINOSUS, NOT INTRACTABLE: ICD-10-CM

## 2024-07-29 DIAGNOSIS — G43.709 CHRONIC MIGRAINE WITHOUT AURA WITHOUT STATUS MIGRAINOSUS, NOT INTRACTABLE: ICD-10-CM

## 2024-07-29 RX ORDER — SUMATRIPTAN SUCCINATE 50 MG/1
TABLET ORAL
Qty: 36 TABLET | Refills: 1 | Status: SHIPPED | OUTPATIENT
Start: 2024-07-29 | End: 2024-09-23 | Stop reason: SDUPTHER

## 2024-09-23 ENCOUNTER — OFFICE VISIT (OUTPATIENT)
Dept: NEUROLOGY | Facility: CLINIC | Age: 49
End: 2024-09-23
Attending: NURSE PRACTITIONER
Payer: COMMERCIAL

## 2024-09-23 VITALS
BODY MASS INDEX: 28.73 KG/M2 | DIASTOLIC BLOOD PRESSURE: 78 MMHG | RESPIRATION RATE: 18 BRPM | WEIGHT: 178 LBS | HEART RATE: 64 BPM | SYSTOLIC BLOOD PRESSURE: 124 MMHG | OXYGEN SATURATION: 97 % | TEMPERATURE: 98 F

## 2024-09-23 DIAGNOSIS — G43.709 CHRONIC MIGRAINE WITHOUT AURA WITHOUT STATUS MIGRAINOSUS, NOT INTRACTABLE: ICD-10-CM

## 2024-09-23 DIAGNOSIS — G43.009 MIGRAINE WITHOUT AURA AND WITHOUT STATUS MIGRAINOSUS, NOT INTRACTABLE: ICD-10-CM

## 2024-09-23 DIAGNOSIS — G24.3 ISOLATED CERVICAL DYSTONIA: ICD-10-CM

## 2024-09-23 PROCEDURE — 99999 PR OFFICE/OUTPT VISIT,PROCEDURE ONLY: CPT | Performed by: NURSE PRACTITIONER

## 2024-09-23 PROCEDURE — 64615 CHEMODENERV MUSC MIGRAINE: CPT | Performed by: NURSE PRACTITIONER

## 2024-09-23 RX ORDER — SUMATRIPTAN SUCCINATE 50 MG/1
TABLET ORAL
Qty: 36 TABLET | Refills: 1 | Status: SHIPPED | OUTPATIENT
Start: 2024-09-23 | End: 2024-12-17 | Stop reason: SDUPTHER

## 2024-09-23 RX ORDER — DULOXETIN HYDROCHLORIDE 20 MG/1
20 CAPSULE, DELAYED RELEASE ORAL 2 TIMES DAILY
COMMUNITY
Start: 2024-08-24

## 2024-09-23 NOTE — PROGRESS NOTES
Patient returns for second Botox injections .    Since last seen headaches have improved with Botox injections.   Correlated with headache diary, decreased abortive medication use.    At least 50 % of reduction of frequency and severity of moderate to severe headaches      Botox Injections   Botox lot number : [id number]  Expiration Date : 12/2026    NDC# 3587-2085-14     Patient informed and consent was obtained .   4 cc of Normal saline were added to one vial of Botox Type A resulting in 200 Units of Botox . After the patient consented to the procedure the following muscles were injected after cleaning the overlying skin with alcohol.  New FDA mandated warnings provided to the patient with instructions to seek medical attention for difficulty swallowing or breathing .      Frontalis 10 units right and left (divided in 2 areas each side )    / Glabellar 5 units right and left   Procerus 5 units  Temporalis 20 units right and left (divided in 4 areas each side )   Trapezius 15 units right and left (divided in 3 areas each side )   Cervical Paraspinals 10 units right and left (divided in 2 areas each side )   Occipitalis 15 units right and left (divided in 3 areas each side )         A total of 155 units was used and 45 units wasted.      The patient tolerated the procedure well.     Diagnoses and all orders for this visit:    Chronic migraine without aura without status migrainosus, not intractable  -     [facility] Botulinum Toxin Injection Appointment Request  -     rimegepant (NURTEC ODT) 75 mg tablet,disintegrating; Take 1 tablet (75 mg total) by mouth as needed (migraine). One at the onset of migraine, max one dose in 24 hours. May take one ODT every other day for prevention    Isolated cervical dystonia  -     [facility] Botulinum Toxin Injection Appointment Request  -     onabotulinumtoxinA (BOTOX) 200 unit injection 200 Units    Migraine without aura and without status migrainosus, not intractable  -      SUMAtriptan (IMITREX) 50 mg tablet; Take one tab at onset of migraine , may repeat in 2 hrs as needed, max 2 tabs per day

## 2024-12-17 ENCOUNTER — OFFICE VISIT (OUTPATIENT)
Dept: NEUROLOGY | Facility: CLINIC | Age: 49
End: 2024-12-17
Attending: NURSE PRACTITIONER
Payer: COMMERCIAL

## 2024-12-17 VITALS
SYSTOLIC BLOOD PRESSURE: 104 MMHG | BODY MASS INDEX: 28.57 KG/M2 | WEIGHT: 177 LBS | OXYGEN SATURATION: 98 % | RESPIRATION RATE: 18 BRPM | HEART RATE: 86 BPM | DIASTOLIC BLOOD PRESSURE: 68 MMHG

## 2024-12-17 DIAGNOSIS — G43.709 CHRONIC MIGRAINE WITHOUT AURA WITHOUT STATUS MIGRAINOSUS, NOT INTRACTABLE: ICD-10-CM

## 2024-12-17 DIAGNOSIS — G43.009 MIGRAINE WITHOUT AURA AND WITHOUT STATUS MIGRAINOSUS, NOT INTRACTABLE: ICD-10-CM

## 2024-12-17 DIAGNOSIS — G24.3 ISOLATED CERVICAL DYSTONIA: ICD-10-CM

## 2024-12-17 PROCEDURE — 64615 CHEMODENERV MUSC MIGRAINE: CPT | Performed by: NURSE PRACTITIONER

## 2024-12-17 PROCEDURE — 99999 PR OFFICE/OUTPT VISIT,PROCEDURE ONLY: CPT | Performed by: NURSE PRACTITIONER

## 2024-12-17 RX ORDER — SUMATRIPTAN SUCCINATE 50 MG/1
TABLET ORAL
Qty: 36 TABLET | Refills: 1 | Status: SHIPPED | OUTPATIENT
Start: 2024-12-17 | End: 2025-02-25 | Stop reason: SDUPTHER

## 2024-12-17 RX ORDER — KETOROLAC TROMETHAMINE 10 MG/1
TABLET, FILM COATED ORAL
Qty: 10 TABLET | Refills: 1 | Status: SHIPPED | OUTPATIENT
Start: 2024-12-17

## 2024-12-17 RX ORDER — METHOCARBAMOL 500 MG/1
500 TABLET, FILM COATED ORAL DAILY PRN
COMMUNITY

## 2024-12-17 RX ORDER — PROCHLORPERAZINE MALEATE 5 MG
TABLET ORAL
Qty: 15 TABLET | Refills: 1 | Status: SHIPPED | OUTPATIENT
Start: 2024-12-17

## 2024-12-17 NOTE — PROGRESS NOTES
Patient returns for third Botox injections .     Since last seen headaches have improved with Botox injections.   Correlated with headache diary, decreased abortive medication use.     At least 50 % of reduction of frequency and severity of moderate to severe headaches       Botox Injections   Botox lot number : [id number]  Expiration Date : 08/2026     Sample vial used      Patient informed and consent was obtained .   4 cc of Normal saline were added to one vial of Botox Type A resulting in 200 Units of Botox . After the patient consented to the procedure the following muscles were injected after cleaning the overlying skin with alcohol.  New FDA mandated warnings provided to the patient with instructions to seek medical attention for difficulty swallowing or breathing .      Frontalis 10 units right and left (divided in 2 areas each side )    / Glabellar 5 units right and left   Procerus 5 units  Temporalis 20 units right and left (divided in 4 areas each side )   Trapezius 30 units right and left (divided in 5 areas each side ) anterior and posterior each side     Cervical Paraspinals 10 units right and left (divided in 2 areas each side )   Occipitalis 15 units right and left (divided in 3 areas each side )         A total of 185 units was used and 15 units wasted.      The patient tolerated the procedure well.     Diagnoses and all orders for this visit:    Chronic migraine without aura without status migrainosus, not intractable    -     [facility] Botulinum Toxin Injection Appointment Request  -     onabotulinumtoxinA (BOTOX) 200 unit injection 200 Units  -     rimegepant (NURTEC ODT) 75 mg tablet,disintegrating; Take 1 tablet (75 mg total) by mouth as needed (migraine). One at the onset of migraine, max one dose in 24 hours. May take one ODT every other day for prevention    Isolated cervical dystonia  -     [facility] Botulinum Toxin Injection Appointment Request  -     onabotulinumtoxinA (BOTOX) 200 unit  injection 200 Units    Migraine without aura and without status migrainosus, not intractable    -     SUMAtriptan (IMITREX) 50 mg tablet; Take one tab at onset of migraine , may repeat in 2 hrs as needed, max 2 tabs per day    -     ketorolac (TORADOL) 10 mg tablet; Take one tab every 12 hrs as needed . Do not use more than 5 days per month    -     prochlorperazine (COMPAZINE) 5 mg tablet; Take one tab every 8 hrs as needed for severe migraine and nausea     For acute migraine :     Step 1: Nurtec 75 mg     Step 2: Toradol 10 mg     With compazine 10 mg (for nausea )     Step 3: Sumatriptan 100 - may repeat in 2 hrs as needed     You can add Benadryl 25 mg at bedtime

## 2024-12-17 NOTE — PATIENT INSTRUCTIONS
Step 1: Nurtec 75 mg     Step 2: Toradol 10 mg   With compazine 10 mg (for nausea )     Step 3: Sumatriptan 100 - may repeat in 2 hrs as needed     You can add Benadryl 25 mg at bedtime

## 2025-02-25 DIAGNOSIS — G43.009 MIGRAINE WITHOUT AURA AND WITHOUT STATUS MIGRAINOSUS, NOT INTRACTABLE: ICD-10-CM

## 2025-02-25 DIAGNOSIS — G43.709 CHRONIC MIGRAINE WITHOUT AURA WITHOUT STATUS MIGRAINOSUS, NOT INTRACTABLE: ICD-10-CM

## 2025-02-25 RX ORDER — SUMATRIPTAN SUCCINATE 50 MG/1
TABLET ORAL
Qty: 36 TABLET | Refills: 1 | Status: SHIPPED | OUTPATIENT
Start: 2025-02-25

## (undated) DEVICE — INTENDED FOR TISSUE SEPARATION, AND OTHER PROCEDURES THAT REQUIRE A SHARP SURGICAL BLADE TO PUNCTURE OR CUT.: Brand: BARD-PARKER SAFETY BLADES SIZE 10, STERILE

## (undated) DEVICE — TIBURON SPLIT SHEET: Brand: CONVERTORS

## (undated) DEVICE — SNAP KOVER: Brand: UNBRANDED

## (undated) DEVICE — GLOVE INDICATOR PI UNDERGLOVE SZ 7.5 BLUE

## (undated) DEVICE — PATIENT REMOTE KIT: Brand: SENZA®

## (undated) DEVICE — DRESSING MEPILEX AG BORDER 4 X 8 IN

## (undated) DEVICE — SURGIFOAM 8.5 X 12.5

## (undated) DEVICE — SUT MONOCRYL 4-0 PS-2 27 IN Y426H

## (undated) DEVICE — SPONGE PVP SCRUB WING STERILE

## (undated) DEVICE — INTENDED FOR TISSUE SEPARATION, AND OTHER PROCEDURES THAT REQUIRE A SHARP SURGICAL BLADE TO PUNCTURE OR CUT.: Brand: BARD-PARKER ® CARBON RIB-BACK BLADES

## (undated) DEVICE — SUT SILK 2-0 SH 30 IN K833H

## (undated) DEVICE — PROXIMATE PLUS MD MULTI-DIRECTIONAL RELEASE SKIN STAPLERS CONTAINS 35 STAINLESS STEEL STAPLES APPROXIMATE CLOSED DIMENSIONS: 6.9MM X 3.9MM WIDE: Brand: PROXIMATE

## (undated) DEVICE — ANTIBACTERIAL VIOLET BRAIDED (POLYGLACTIN 910), SYNTHETIC ABSORBABLE SUTURE: Brand: COATED VICRYL

## (undated) DEVICE — PENCIL ELECTROSURG E-Z CLEAN -0035H

## (undated) DEVICE — TUNNELING TOOL KIT, 35CM: Brand: NEVRO®

## (undated) DEVICE — GLOVE SRG BIOGEL 7.5

## (undated) DEVICE — BETHLEHEM UNIVERSAL SPINE, KIT: Brand: CARDINAL HEALTH

## (undated) DEVICE — SENZA®  CHARGER KIT: Brand: SENZA®

## (undated) DEVICE — TOOL 14MH30 LEGEND 14CM 3MM: Brand: MIDAS REX ™

## (undated) DEVICE — PLUMEPEN PRO 10FT

## (undated) DEVICE — FLOSEAL HEMOSTATIC MATRIX, 5 ML: Brand: FLOSEAL

## (undated) DEVICE — NEEDLE 25G X 1 1/2

## (undated) DEVICE — PREP SURGICAL PURPREP 26ML